# Patient Record
Sex: MALE | Race: WHITE | NOT HISPANIC OR LATINO | Employment: OTHER | URBAN - METROPOLITAN AREA
[De-identification: names, ages, dates, MRNs, and addresses within clinical notes are randomized per-mention and may not be internally consistent; named-entity substitution may affect disease eponyms.]

---

## 2023-03-13 PROBLEM — M48.061 LUMBAR SPINAL STENOSIS: Status: ACTIVE | Noted: 2023-03-13

## 2023-03-13 PROBLEM — J30.9 ALLERGIC RHINITIS: Status: ACTIVE | Noted: 2023-03-13

## 2023-03-13 PROBLEM — E66.811 CLASS 1 OBESITY WITH BODY MASS INDEX (BMI) OF 30.0 TO 30.9 IN ADULT: Status: ACTIVE | Noted: 2023-03-13

## 2023-03-13 PROBLEM — I63.9 CEREBROVASCULAR ACCIDENT (CVA) (MULTI): Status: ACTIVE | Noted: 2023-03-13

## 2023-03-13 PROBLEM — F41.8 SITUATIONAL ANXIETY: Status: ACTIVE | Noted: 2023-03-13

## 2023-03-13 PROBLEM — I71.40 ABDOMINAL AORTIC ANEURYSM WITHOUT RUPTURE (CMS-HCC): Status: ACTIVE | Noted: 2023-03-13

## 2023-03-13 PROBLEM — E66.9 CLASS 1 OBESITY WITH BODY MASS INDEX (BMI) OF 30.0 TO 30.9 IN ADULT: Status: ACTIVE | Noted: 2023-03-13

## 2023-03-13 PROBLEM — I50.20 HFREF (HEART FAILURE WITH REDUCED EJECTION FRACTION) (MULTI): Status: ACTIVE | Noted: 2023-03-13

## 2023-03-13 PROBLEM — H26.9 CATARACT: Status: ACTIVE | Noted: 2023-03-13

## 2023-03-13 PROBLEM — M54.50 LOW BACK PAIN: Status: ACTIVE | Noted: 2023-03-13

## 2023-03-13 PROBLEM — R05.9 COUGH: Status: ACTIVE | Noted: 2023-03-13

## 2023-03-13 PROBLEM — M19.012 ARTHRITIS OF LEFT GLENOHUMERAL JOINT: Status: ACTIVE | Noted: 2023-03-13

## 2023-03-13 PROBLEM — N52.9 ED (ERECTILE DYSFUNCTION): Status: ACTIVE | Noted: 2023-03-13

## 2023-03-13 PROBLEM — R30.0 BURNING WITH URINATION: Status: ACTIVE | Noted: 2023-03-13

## 2023-03-13 PROBLEM — R26.2 DIFFICULTY WALKING: Status: ACTIVE | Noted: 2023-03-13

## 2023-03-13 PROBLEM — R60.0 EDEMA, LOWER EXTREMITY: Status: ACTIVE | Noted: 2023-03-13

## 2023-03-13 PROBLEM — K58.0 IRRITABLE BOWEL SYNDROME WITH DIARRHEA: Status: ACTIVE | Noted: 2023-03-13

## 2023-03-13 PROBLEM — R35.0 URINARY FREQUENCY: Status: ACTIVE | Noted: 2023-03-13

## 2023-03-13 PROBLEM — R55 NEAR SYNCOPE: Status: ACTIVE | Noted: 2023-03-13

## 2023-03-13 PROBLEM — M25.512 PAIN IN JOINT OF LEFT SHOULDER: Status: ACTIVE | Noted: 2023-03-13

## 2023-03-13 PROBLEM — R30.0 DYSURIA: Status: ACTIVE | Noted: 2023-03-13

## 2023-03-13 PROBLEM — I10 HYPERTENSION: Status: ACTIVE | Noted: 2023-03-13

## 2023-03-13 PROBLEM — E78.00 HYPERCHOLESTEREMIA: Status: ACTIVE | Noted: 2023-03-13

## 2023-03-13 PROBLEM — R14.0 ABDOMINAL BLOATING: Status: ACTIVE | Noted: 2023-03-13

## 2023-03-13 PROBLEM — I25.10 CORONARY ARTERY DISEASE: Status: ACTIVE | Noted: 2023-03-13

## 2023-03-13 PROBLEM — G25.0 ESSENTIAL TREMOR: Status: ACTIVE | Noted: 2023-03-13

## 2023-03-13 RX ORDER — ARGININE 500 MG
CAPSULE ORAL SEE ADMIN INSTRUCTIONS
COMMUNITY

## 2023-03-13 RX ORDER — LISINOPRIL 5 MG/1
1 TABLET ORAL DAILY
COMMUNITY
Start: 2018-01-25 | End: 2024-02-12 | Stop reason: SDUPTHER

## 2023-03-13 RX ORDER — VIT C/E/ZN/COPPR/LUTEIN/ZEAXAN 250MG-90MG
1 CAPSULE ORAL DAILY
COMMUNITY

## 2023-03-13 RX ORDER — BACLOFEN 20 MG
1 TABLET ORAL DAILY
COMMUNITY

## 2023-03-13 RX ORDER — TAMSULOSIN HYDROCHLORIDE 0.4 MG/1
1 CAPSULE ORAL NIGHTLY
COMMUNITY
Start: 2022-11-18 | End: 2023-03-14 | Stop reason: ALTCHOICE

## 2023-03-13 RX ORDER — MULTIVITAMIN
TABLET ORAL SEE ADMIN INSTRUCTIONS
COMMUNITY

## 2023-03-13 RX ORDER — ASCORBIC ACID 125 MG
1 TABLET,CHEWABLE ORAL DAILY
COMMUNITY

## 2023-03-13 RX ORDER — ASPIRIN 81 MG/1
1 TABLET ORAL DAILY
COMMUNITY
Start: 2017-10-25

## 2023-03-13 RX ORDER — HYDROCHLOROTHIAZIDE 12.5 MG/1
1 CAPSULE ORAL EVERY OTHER DAY
COMMUNITY
Start: 2019-12-30

## 2023-03-13 RX ORDER — ACETAMINOPHEN 500 MG/1
1 CAPSULE, LIQUID FILLED ORAL DAILY
COMMUNITY

## 2023-03-13 RX ORDER — NITROGLYCERIN 0.4 MG/1
TABLET SUBLINGUAL SEE ADMIN INSTRUCTIONS
COMMUNITY
Start: 2017-09-23

## 2023-03-13 RX ORDER — FAMOTIDINE 20 MG/1
1 TABLET, FILM COATED ORAL DAILY
COMMUNITY
Start: 2017-10-25

## 2023-03-13 RX ORDER — ROSUVASTATIN CALCIUM 40 MG/1
1 TABLET, COATED ORAL DAILY
COMMUNITY
Start: 2017-09-23

## 2023-03-13 RX ORDER — POTASSIUM CHLORIDE 750 MG/1
1 TABLET, FILM COATED, EXTENDED RELEASE ORAL EVERY OTHER DAY
COMMUNITY
Start: 2019-12-30

## 2023-03-14 ENCOUNTER — OFFICE VISIT (OUTPATIENT)
Dept: PRIMARY CARE | Facility: CLINIC | Age: 78
End: 2023-03-14
Payer: MEDICARE

## 2023-03-14 VITALS
HEART RATE: 88 BPM | WEIGHT: 215.6 LBS | SYSTOLIC BLOOD PRESSURE: 122 MMHG | DIASTOLIC BLOOD PRESSURE: 70 MMHG | OXYGEN SATURATION: 98 % | BODY MASS INDEX: 30.07 KG/M2

## 2023-03-14 DIAGNOSIS — H61.23 BILATERAL IMPACTED CERUMEN: ICD-10-CM

## 2023-03-14 DIAGNOSIS — N39.0 URINARY TRACT INFECTION WITHOUT HEMATURIA, SITE UNSPECIFIED: Primary | ICD-10-CM

## 2023-03-14 PROBLEM — R79.89 LOW TESTOSTERONE IN MALE: Status: ACTIVE | Noted: 2023-03-14

## 2023-03-14 LAB
APPEARANCE UR: CLEAR
BILIRUB UR QL STRIP: NEGATIVE
COLOR UR: YELLOW
GLUCOSE UR STRIP-MCNC: NEGATIVE MG/DL
HGB UR QL STRIP: NEGATIVE
KETONES UR STRIP-MCNC: NEGATIVE MG/DL
LEUKOCYTE ESTERASE UR QL STRIP: NEGATIVE
NITRITE UR QL STRIP: NEGATIVE
PH UR STRIP: 7 [PH]
PROT UR STRIP-MCNC: ABNORMAL MG/DL
SP GR UR STRIP.AUTO: 1.02
UROBILINOGEN UR STRIP-ACNC: 0.2 E.U./DL

## 2023-03-14 PROCEDURE — 3074F SYST BP LT 130 MM HG: CPT | Performed by: STUDENT IN AN ORGANIZED HEALTH CARE EDUCATION/TRAINING PROGRAM

## 2023-03-14 PROCEDURE — 1159F MED LIST DOCD IN RCRD: CPT | Performed by: STUDENT IN AN ORGANIZED HEALTH CARE EDUCATION/TRAINING PROGRAM

## 2023-03-14 PROCEDURE — 69209 REMOVE IMPACTED EAR WAX UNI: CPT | Performed by: STUDENT IN AN ORGANIZED HEALTH CARE EDUCATION/TRAINING PROGRAM

## 2023-03-14 PROCEDURE — 99214 OFFICE O/P EST MOD 30 MIN: CPT | Performed by: STUDENT IN AN ORGANIZED HEALTH CARE EDUCATION/TRAINING PROGRAM

## 2023-03-14 PROCEDURE — 87086 URINE CULTURE/COLONY COUNT: CPT

## 2023-03-14 PROCEDURE — 81003 URINALYSIS AUTO W/O SCOPE: CPT | Performed by: STUDENT IN AN ORGANIZED HEALTH CARE EDUCATION/TRAINING PROGRAM

## 2023-03-14 PROCEDURE — 3078F DIAST BP <80 MM HG: CPT | Performed by: STUDENT IN AN ORGANIZED HEALTH CARE EDUCATION/TRAINING PROGRAM

## 2023-03-14 NOTE — PROGRESS NOTES
Subjective   Patient ID: Raghu Williamson is a 77 y.o. male who presents for ear blockage and Cystitis.    HPI   Comes in with bilateral ear clogging desires cerumen removal.  Also he does have a history of recurrent UTIs.  Some mild dysuria today.    Review of Systems  Constitutional: NO F, chills, or sweats  Eyes: no blurred vision or visual disturbance  ENT: Bilateral hearing loss  Cardiovascular: no chest pain, no edema, no palps and no syncope.   Respiratory: no cough,no s.o.b. and no wheezing  Gastrointestinal: no abdominal pain, No C/D no N/V, no blood in stools  Genitourinary: Some dysuria and increased urinary frequency  Objective   /70 (BP Location: Left arm, Patient Position: Sitting, BP Cuff Size: Adult)   Pulse 88   Wt 97.8 kg (215 lb 9.6 oz)   SpO2 98%   BMI 30.07 kg/m²     Physical Exam  gen- a & o x 3, nad, pleasant  heent- eomi, perrla, significant bilateral cerumen impaction, once removed no concerns for infection  Neck- supple, nontender, no palpable or enlarged nodes, no thyromegaly  heart- rrr, no murmurs  lungs- cta b/l , no w/r/r      Assessment/Plan     #1.  Cerumen impaction.  Irrigation today in clinic bilaterally.    2.  Increased urinary frequency dysuria.  Urinalysis and urine culture ordered today.    Will send urine for culture and sensitivity.  Instructed the patient to drink plenty of fluids.  May try cranberry capsules over the counter for prevention.  Discussed ways of reducing chance of recurrence including voiding when sense the urge, urinating after sex and baths/swimming/hot tubs, and wearing cotton underwear.   Take Tylenol or Motrin/Aleve as needed for pain.   Complete all antibiotics until completes.   If you were prescribed Pyridium (phenazopyridine) for urinary burning, only take up to 3 days.

## 2023-03-14 NOTE — PATIENT INSTRUCTIONS
#1.  Cerumen impaction.  Irrigation today in clinic bilaterally.    2.  Increased urinary frequency dysuria.  Urinalysis and urine culture ordered today.    Will send urine for culture and sensitivity.  Instructed the patient to drink plenty of fluids.  May try cranberry capsules over the counter for prevention.  Discussed ways of reducing chance of recurrence including voiding when sense the urge, urinating after sex and baths/swimming/hot tubs, and wearing cotton underwear.   Take Tylenol or Motrin/Aleve as needed for pain.   Complete all antibiotics until completes.   If you were prescribed Pyridium (phenazopyridine) for urinary burning, only take up to 3 days.

## 2023-03-16 LAB — URINE CULTURE: NORMAL

## 2023-08-02 LAB
ALANINE AMINOTRANSFERASE (SGPT) (U/L) IN SER/PLAS: 17 U/L (ref 10–52)
ALBUMIN (G/DL) IN SER/PLAS: 4.2 G/DL (ref 3.4–5)
ALKALINE PHOSPHATASE (U/L) IN SER/PLAS: 55 U/L (ref 33–136)
ANION GAP IN SER/PLAS: 14 MMOL/L (ref 10–20)
ASPARTATE AMINOTRANSFERASE (SGOT) (U/L) IN SER/PLAS: 18 U/L (ref 9–39)
BILIRUBIN TOTAL (MG/DL) IN SER/PLAS: 0.8 MG/DL (ref 0–1.2)
CALCIUM (MG/DL) IN SER/PLAS: 9.7 MG/DL (ref 8.6–10.6)
CARBON DIOXIDE, TOTAL (MMOL/L) IN SER/PLAS: 30 MMOL/L (ref 21–32)
CHLORIDE (MMOL/L) IN SER/PLAS: 101 MMOL/L (ref 98–107)
CHOLESTEROL (MG/DL) IN SER/PLAS: 91 MG/DL (ref 0–199)
CHOLESTEROL IN HDL (MG/DL) IN SER/PLAS: 38.3 MG/DL
CHOLESTEROL/HDL RATIO: 2.4
CREATININE (MG/DL) IN SER/PLAS: 0.86 MG/DL (ref 0.5–1.3)
GFR MALE: 87 ML/MIN/1.73M2
GLUCOSE (MG/DL) IN SER/PLAS: 117 MG/DL (ref 74–99)
LDL: 32 MG/DL (ref 0–99)
POTASSIUM (MMOL/L) IN SER/PLAS: 4.1 MMOL/L (ref 3.5–5.3)
PROTEIN TOTAL: 6.6 G/DL (ref 6.4–8.2)
SODIUM (MMOL/L) IN SER/PLAS: 141 MMOL/L (ref 136–145)
TRIGLYCERIDE (MG/DL) IN SER/PLAS: 102 MG/DL (ref 0–149)
UREA NITROGEN (MG/DL) IN SER/PLAS: 21 MG/DL (ref 6–23)
VLDL: 20 MG/DL (ref 0–40)

## 2023-10-04 ENCOUNTER — OFFICE VISIT (OUTPATIENT)
Dept: ORTHOPEDIC SURGERY | Facility: CLINIC | Age: 78
End: 2023-10-04
Payer: MEDICARE

## 2023-10-04 DIAGNOSIS — M25.512 ARTHRALGIA OF SHOULDER REGION, LEFT: Primary | ICD-10-CM

## 2023-10-04 DIAGNOSIS — M19.012 ARTHRITIS OF LEFT SHOULDER REGION: ICD-10-CM

## 2023-10-04 PROCEDURE — 20610 DRAIN/INJ JOINT/BURSA W/O US: CPT | Performed by: ORTHOPAEDIC SURGERY

## 2023-10-04 PROCEDURE — 1160F RVW MEDS BY RX/DR IN RCRD: CPT | Performed by: ORTHOPAEDIC SURGERY

## 2023-10-04 PROCEDURE — 1036F TOBACCO NON-USER: CPT | Performed by: ORTHOPAEDIC SURGERY

## 2023-10-04 PROCEDURE — 99213 OFFICE O/P EST LOW 20 MIN: CPT | Performed by: ORTHOPAEDIC SURGERY

## 2023-10-04 PROCEDURE — 1159F MED LIST DOCD IN RCRD: CPT | Performed by: ORTHOPAEDIC SURGERY

## 2023-10-04 RX ORDER — LIDOCAINE HYDROCHLORIDE 20 MG/ML
2 INJECTION, SOLUTION INFILTRATION; PERINEURAL
Status: COMPLETED | OUTPATIENT
Start: 2023-10-04 | End: 2023-10-04

## 2023-10-04 RX ORDER — TRIAMCINOLONE ACETONIDE 40 MG/ML
80 INJECTION, SUSPENSION INTRA-ARTICULAR; INTRAMUSCULAR
Status: COMPLETED | OUTPATIENT
Start: 2023-10-04 | End: 2023-10-04

## 2023-10-04 RX ADMIN — LIDOCAINE HYDROCHLORIDE 2 ML: 20 INJECTION, SOLUTION INFILTRATION; PERINEURAL at 15:02

## 2023-10-04 RX ADMIN — TRIAMCINOLONE ACETONIDE 80 MG: 40 INJECTION, SUSPENSION INTRA-ARTICULAR; INTRAMUSCULAR at 15:02

## 2023-10-04 NOTE — PROGRESS NOTES
Subjective    Patient ID: Raghu Williamson is a 78 y.o. male.    Chief Complaint: Left shoulder pain  Last Surgery: No surgery found  Last Surgery Date: No surgery found    HPI  Patient is an 78-year-old male who comes in for follow-up of his left shoulder arthritis.  He has been treating this conservatively.  His last Kenalog injection was approximately 8 months ago.  He denies any new trauma.  He denies numbness and paresthesias in his left upper extremity.    Objective   Ortho Exam  Patient is in no acute distress.  Exam of his left shoulder reveals active forward elevation is about 145 degrees.  There is mild ratcheting crepitus with rotation.  He is neurovascular intact distally to his median, radial ulnar nerves.  There is no warmth erythema in his left shoulder.  He has 4 out of 5 strength with resisted forward elevation as well as with resisted internal and external rotation.    Assessment/Plan   Encounter Diagnoses:  Arthralgia of shoulder region, left    Arthritis of left shoulder region    Patient has left shoulder pain secondary to his known left shoulder arthritis.  He elected undergo another Kenalog injection today in the office.    L Inj/Asp: L glenohumeral on 10/4/2023 3:02 PM  Indications: pain  Details: 22 G needle, posterior approach  Medications: 80 mg triamcinolone acetonide 40 mg/mL; 2 mL lidocaine 20 mg/mL (2 %)  Procedure, treatment alternatives, risks and benefits explained, specific risks discussed. Consent was given by the patient. Immediately prior to procedure a time out was called to verify the correct patient, procedure, equipment, support staff and site/side marked as required. Patient was prepped and draped in the usual sterile fashion.       Patient tolerated the procedure without any complications.  He will follow-up as his symptoms dictate.

## 2023-10-04 NOTE — LETTER
October 4, 2023     Carlos Manuel Vicente DO  [address]  [address]  [facility] [address]    Patient: Raghu Williamson   YOB: 1945   Date of Visit: 10/4/2023       Dear Dr. Carlos Manuel Vicente DO:    Thank you for referring Raghu Williamson to me for evaluation. Below are my notes for this consultation.  If you have questions, please do not hesitate to call me. I look forward to following your patient along with you.       Sincerely,     Valerio Cortez MD      CC: No Recipients  ______________________________________________________________________________________    Subjective   Patient ID: Raghu Williamson is a 78 y.o. male.    Chief Complaint: Left shoulder pain  Last Surgery: No surgery found  Last Surgery Date: No surgery found    HPI  Patient is an 78-year-old male who comes in for follow-up of his left shoulder arthritis.  He has been treating this conservatively.  His last Kenalog injection was approximately 8 months ago.  He denies any new trauma.  He denies numbness and paresthesias in his left upper extremity.    Objective  Ortho Exam  Patient is in no acute distress.  Exam of his left shoulder reveals active forward elevation is about 145 degrees.  There is mild ratcheting crepitus with rotation.  He is neurovascular intact distally to his median, radial ulnar nerves.  There is no warmth erythema in his left shoulder.  He has 4 out of 5 strength with resisted forward elevation as well as with resisted internal and external rotation.    Assessment/Plan  Encounter Diagnoses:  Arthralgia of shoulder region, left    Arthritis of left shoulder region    Patient has left shoulder pain secondary to his known left shoulder arthritis.  He elected undergo another Kenalog injection today in the office.    L Inj/Asp: L glenohumeral on 10/4/2023 3:02 PM  Indications: pain  Details: 22 G needle, posterior approach  Medications: 80 mg triamcinolone acetonide 40 mg/mL; 2 mL lidocaine 20 mg/mL (2  %)  Procedure, treatment alternatives, risks and benefits explained, specific risks discussed. Consent was given by the patient. Immediately prior to procedure a time out was called to verify the correct patient, procedure, equipment, support staff and site/side marked as required. Patient was prepped and draped in the usual sterile fashion.       Patient tolerated the procedure without any complications.  He will follow-up as his symptoms dictate.

## 2023-10-05 ENCOUNTER — LAB (OUTPATIENT)
Dept: LAB | Facility: LAB | Age: 78
End: 2023-10-05
Payer: MEDICARE

## 2023-10-05 DIAGNOSIS — Z12.5 ENCOUNTER FOR SCREENING FOR MALIGNANT NEOPLASM OF PROSTATE: ICD-10-CM

## 2023-10-05 DIAGNOSIS — Z12.5 ENCOUNTER FOR SCREENING FOR MALIGNANT NEOPLASM OF PROSTATE: Primary | ICD-10-CM

## 2023-10-05 PROCEDURE — 36415 COLL VENOUS BLD VENIPUNCTURE: CPT

## 2023-10-05 PROCEDURE — G0103 PSA SCREENING: HCPCS

## 2023-10-06 LAB — PSA SERPL-MCNC: 2.34 NG/ML

## 2023-10-09 ENCOUNTER — TELEPHONE (OUTPATIENT)
Dept: PRIMARY CARE | Facility: CLINIC | Age: 78
End: 2023-10-09

## 2023-10-09 ENCOUNTER — CLINICAL SUPPORT (OUTPATIENT)
Dept: PRIMARY CARE | Facility: CLINIC | Age: 78
End: 2023-10-09
Payer: MEDICARE

## 2023-10-09 PROCEDURE — G0008 ADMIN INFLUENZA VIRUS VAC: HCPCS | Performed by: FAMILY MEDICINE

## 2023-10-09 PROCEDURE — 90662 IIV NO PRSV INCREASED AG IM: CPT | Performed by: FAMILY MEDICINE

## 2023-10-12 ENCOUNTER — APPOINTMENT (OUTPATIENT)
Dept: UROLOGY | Facility: HOSPITAL | Age: 78
End: 2023-10-12
Payer: MEDICARE

## 2023-11-21 ENCOUNTER — OFFICE VISIT (OUTPATIENT)
Dept: UROLOGY | Facility: CLINIC | Age: 78
End: 2023-11-21
Payer: MEDICARE

## 2023-11-21 VITALS
TEMPERATURE: 97.5 F | SYSTOLIC BLOOD PRESSURE: 151 MMHG | HEART RATE: 80 BPM | DIASTOLIC BLOOD PRESSURE: 85 MMHG | BODY MASS INDEX: 30.13 KG/M2 | WEIGHT: 216 LBS

## 2023-11-21 DIAGNOSIS — R30.0 DYSURIA: ICD-10-CM

## 2023-11-21 DIAGNOSIS — N52.9 ERECTILE DYSFUNCTION, UNSPECIFIED ERECTILE DYSFUNCTION TYPE: ICD-10-CM

## 2023-11-21 DIAGNOSIS — R35.0 URINARY FREQUENCY: ICD-10-CM

## 2023-11-21 DIAGNOSIS — N32.81 OAB (OVERACTIVE BLADDER): Primary | ICD-10-CM

## 2023-11-21 LAB
POC APPEARANCE, URINE: CLEAR
POC BILIRUBIN, URINE: NEGATIVE
POC BLOOD, URINE: NEGATIVE
POC COLOR, URINE: YELLOW
POC GLUCOSE, URINE: NEGATIVE MG/DL
POC KETONES, URINE: NEGATIVE MG/DL
POC LEUKOCYTES, URINE: NEGATIVE
POC NITRITE,URINE: NEGATIVE
POC PH, URINE: 7 PH
POC PROTEIN, URINE: NEGATIVE MG/DL
POC SPECIFIC GRAVITY, URINE: 1.02
POC UROBILINOGEN, URINE: 0.2 EU/DL

## 2023-11-21 PROCEDURE — 1036F TOBACCO NON-USER: CPT | Performed by: STUDENT IN AN ORGANIZED HEALTH CARE EDUCATION/TRAINING PROGRAM

## 2023-11-21 PROCEDURE — 99215 OFFICE O/P EST HI 40 MIN: CPT | Performed by: STUDENT IN AN ORGANIZED HEALTH CARE EDUCATION/TRAINING PROGRAM

## 2023-11-21 PROCEDURE — 1159F MED LIST DOCD IN RCRD: CPT | Performed by: STUDENT IN AN ORGANIZED HEALTH CARE EDUCATION/TRAINING PROGRAM

## 2023-11-21 PROCEDURE — 81003 URINALYSIS AUTO W/O SCOPE: CPT | Performed by: STUDENT IN AN ORGANIZED HEALTH CARE EDUCATION/TRAINING PROGRAM

## 2023-11-21 PROCEDURE — 3079F DIAST BP 80-89 MM HG: CPT | Performed by: STUDENT IN AN ORGANIZED HEALTH CARE EDUCATION/TRAINING PROGRAM

## 2023-11-21 PROCEDURE — 3077F SYST BP >= 140 MM HG: CPT | Performed by: STUDENT IN AN ORGANIZED HEALTH CARE EDUCATION/TRAINING PROGRAM

## 2023-11-21 PROCEDURE — 1160F RVW MEDS BY RX/DR IN RCRD: CPT | Performed by: STUDENT IN AN ORGANIZED HEALTH CARE EDUCATION/TRAINING PROGRAM

## 2023-11-22 ENCOUNTER — TELEPHONE (OUTPATIENT)
Dept: UROLOGY | Facility: CLINIC | Age: 78
End: 2023-11-22
Payer: MEDICARE

## 2023-11-22 NOTE — TELEPHONE ENCOUNTER
Pt called in and said flomax was never sent in yesterday after appointment. Would like it sent to [facility] in [place]. Thanks!

## 2023-11-23 RX ORDER — TAMSULOSIN HYDROCHLORIDE 0.4 MG/1
0.4 CAPSULE ORAL DAILY
Qty: 30 CAPSULE | Refills: 2 | Status: SHIPPED | OUTPATIENT
Start: 2023-11-23 | End: 2024-01-16 | Stop reason: SDUPTHER

## 2023-11-23 NOTE — PROGRESS NOTES
Raghu Williamson 1945 is a AGE@ male seen today for follow-up.    Referred by: No referring provider defined for this encounter.    CC: Urinary frequency     HPI:    Patient is an 78-year-old male with a past medical history significant for lower urinary tract symptoms, bladder stone, kidney stone, urinary urgency, erectile dysfunction who presents for his routine follow-up  Patient was last seen by Dr. Costello in August 2023 at which point he had significant complaints of urinary urgency  There was concern that he may have a active bladder stone versus kidney stone  Plan was for a CT scan which was personally reviewed by me today  On encounter today patient states that he has persistent urinary urgency  He tried Flomax for few days but then stopped because he felt like it was not benefiting him    He is also interested in potential interventions for erectile dysfunction  He has tried oral medications with no benefit  I had a discussion with him about ICI today and he would like to think about this    Prostate Specific AG   Date Value Ref Range Status   10/05/2023 2.34 <=4.00 ng/mL Final     Comment:     result     PSA   Date Value Ref Range Status   04/26/2022 2.87 0.00 - 4.00 ng/mL Final     Comment:     The FDA requires that the method used for PSA assay be   reported to the physician. Values obtained with different   assay methods must not be used interchangeably. This test   was performed at [facility] using the Siemens  Twyxt PSA method, which is a sandwich immunoassay using   chemiluminescence for quantitation. The assay is approved  for measurement of prostate-specific antigen (PSA) in   serum and may be used in conjunction with a digital rectal  examination in men 50 years and older as an aid in   detection of prostate cancer.   5-Alpha-reductase inhibitors (e.g. Proscar, Finasteride,   Avodart, Dutasteride and Flori) for the treatment of BPH   have been shown to lower PSA levels by an  average of 50%   after 6 months of treatment.     04/27/2021 1.77 0.00 - 4.00 ng/mL Final     Comment:     The FDA requires that the method used for PSA assay be   reported to the physician. Values obtained with different   assay methods must not be used interchangeably. This test   was performed at [facility] using the Siemens  Atellica PSA method, which is a sandwich immunoassay using   chemiluminescence for quantitation. The assay is approved  for measurement of prostate-specific antigen (PSA) in   serum and may be used in conjunction with a digital rectal  examination in men 50 years and older as an aid in   detection of prostate cancer.   5-Alpha-reductase inhibitors (e.g. Proscar, Finasteride,   Avodart, Dutasteride and Flori) for the treatment of BPH   have been shown to lower PSA levels by an average of 50%   after 6 months of treatment.     03/19/2019 1.40 0.00 - 4.00 ng/mL Final     Comment:     The FDA requires that the method used for PSA  assay be reported to the physician. Values  obtained with different assay methods must not  be used interchangeably.  uses the ADVIA  Centaur PSA method, which is a sandwich  immunoassay using chemiluminescence for  quantitation. The assay is approved for  measurement of prostate-specific antigen (PSA)  in serum and may be used in conjunction with  a digital rectal examination in men 50 years  and older as an aid in detection of prostate  cancer.      03/14/2018 1.25 0.00 - 4.00 ng/mL Final     Comment:     The FDA requires that the method used for PSA  assay be reported to the physician. Values  obtained with different assay methods must not  be used interchangeably.  uses the ADVIA  Centaur PSA method, which is a sandwich  immunoassay using chemiluminescence for  quantitation. The assay is approved for  measurement of prostate-specific antigen (PSA)  in serum and may be used in conjunction with  a digital rectal examination in men 50 years  and  older as an aid in detection of prostate  cancer.           reports that he has never smoked. He has never used smokeless tobacco. He reports that he does not drink alcohol.    Current Outpatient Medications   Medication Sig Dispense Refill    acetaminophen 500 mg capsule Take 1 capsule (500 mg) by mouth once daily.      cholecalciferol (Vitamin D-3) 25 MCG (1000 UT) capsule Take 1 capsule (25 mcg) by mouth once daily.      cyanocobalamin, vitamin B-12, 5,000 mcg capsule Take 1 tablet by mouth once daily.      famotidine (Pepcid) 20 mg tablet Take 1 tablet (20 mg) by mouth once daily.      fish oil concentrate (Omega-3) 120-180 mg capsule Take 1 capsule (1 g) by mouth once daily.      hydroCHLOROthiazide (Microzide) 12.5 mg capsule Take 1 capsule (12.5 mg) by mouth every other day.      lisinopril 5 mg tablet Take 1 tablet (5 mg) by mouth once daily.      magnesium oxide 500 mg tablet Take 1 tablet (500 mg) by mouth once daily.      multivitamin tablet Take by mouth see administration instructions.      nitroglycerin (Nitrostat) 0.4 mg SL tablet Place under the tongue see administration instructions. Take 1 tablet under the tongue every 5 minutes as needed for chest pain      potassium chloride CR (Klor-Con) 10 mEq ER tablet Take 1 tablet (10 mEq) by mouth every other day.      rosuvastatin (Crestor) 40 mg tablet Take 1 tablet (40 mg) by mouth once daily.      arginine, L-arginine, 500 mg capsule Take by mouth see administration instructions.      aspirin 81 mg EC tablet Take 1 tablet (81 mg) by mouth once daily.      NON FORMULARY CBD oil BID      tamsulosin (Flomax) 0.4 mg 24 hr capsule Take 1 capsule (0.4 mg) by mouth once daily. 30 capsule 2     No current facility-administered medications for this visit.       Past Surgical History:   Procedure Laterality Date    CARDIAC CATHETERIZATION  07/26/2018    Cardiac Cath Procedure Summary    HAND SURGERY  11/17/2015    Hand Surgery                                       "                                                                                                                       Family History   Problem Relation Name Age of Onset    Cancer Mother          malignant neoplasm        Allergies   Allergen Reactions    Penicillins Unknown        Past Medical History:   Diagnosis Date    Abnormal findings on diagnostic imaging of heart and coronary circulation     Abnormal echocardiogram    Atherosclerotic heart disease of native coronary artery without angina pectoris 11/18/2022    Coronary artery disease    Body mass index (BMI) 29.0-29.9, adult 05/21/2021    Body mass index (BMI) of 29.0 to 29.9 in adult    Essential (primary) hypertension 07/20/2022    Hypertension, unspecified type    Gout, unspecified     Gout, arthritis    Personal history of nicotine dependence     History of tobacco abuse    Personal history of other diseases of the circulatory system     History of cardiac disorder    Personal history of other specified conditions     History of chest pain    Personal history of transient ischemic attack (TIA), and cerebral infarction without residual deficits 07/30/2019    History of cerebrovascular accident        Review of Systems:   No fevers, chills, chest pain, or shortness of breath.     Physical Exam:  General: no Acute distress, appears well   Psych: Alert and oriented X 3  : Patent meatus with bilaterally palpable testes    Lab Results   Component Value Date    WBC 9.5 05/21/2021    HGB 15.6 05/21/2021    HCT 48.1 05/21/2021     05/21/2021     05/21/2021     Lab Results   Component Value Date    GLUCOSE 117 (H) 08/02/2023    CALCIUM 9.7 08/02/2023     08/02/2023    K 4.1 08/02/2023    CO2 30 08/02/2023     08/02/2023    BUN 21 08/02/2023    CREATININE 0.86 08/02/2023     Lab Results   Component Value Date    PSA 2.34 10/05/2023    PSA 2.87 04/26/2022    PSA 1.77 04/27/2021     No results found for: \"HGBA1C\"  No results found.    PVR "       Raghu was seen today for oab.  Diagnoses and all orders for this visit:  OAB (overactive bladder) (Primary)  -     Cancel: POCT UA Automated manually resulted  -     Measure post void residual  -     POCT UA Automated manually resulted  Urinary frequency  -     tamsulosin (Flomax) 0.4 mg 24 hr capsule; Take 1 capsule (0.4 mg) by mouth once daily.  Erectile dysfunction, unspecified erectile dysfunction type  Dysuria    Is an 78-year-old male with multiple urologic complaints.  Urinary frequency is his primary complaint.  He has a history of bladder stones and kidney stones in the past.  He most recently underwent a CT scan which does not demonstrate any stones in his bladder and there is a small 3 mm stone in his kidney.  He has been prescribed Flomax but has not really taken it.    1.  Lower urinary tract symptoms  I encouraged the patient to make a concerted effort with the Flomax as this may improve his voiding parameters.  Once we have trialed the Flomax we may consider adding trospium.  In addition we will plan to perform cystoscopy if warranted to rule out any anatomic abnormalities.    2.  Erectile dysfunction  I had a extensive discussion with the patient about management of his erectile dysfunction.  His wife is present with him and urges that no aggressive interventions be taken as it is not a high priority for them.  We will follow-up on this.    The dictation was performed using Dragon software. Please excuse any errors. Please contact our office with any inquiries or clarifications.    [unfilled]    Isabel Cardona MD

## 2023-11-28 ENCOUNTER — APPOINTMENT (OUTPATIENT)
Dept: PRIMARY CARE | Facility: CLINIC | Age: 78
End: 2023-11-28
Payer: MEDICARE

## 2023-12-11 ENCOUNTER — OFFICE VISIT (OUTPATIENT)
Dept: PRIMARY CARE | Facility: CLINIC | Age: 78
End: 2023-12-11
Payer: MEDICARE

## 2023-12-11 VITALS
OXYGEN SATURATION: 96 % | WEIGHT: 219.2 LBS | DIASTOLIC BLOOD PRESSURE: 82 MMHG | HEIGHT: 72 IN | TEMPERATURE: 97.7 F | BODY MASS INDEX: 29.69 KG/M2 | SYSTOLIC BLOOD PRESSURE: 136 MMHG | HEART RATE: 78 BPM

## 2023-12-11 DIAGNOSIS — I50.20 HFREF (HEART FAILURE WITH REDUCED EJECTION FRACTION) (MULTI): ICD-10-CM

## 2023-12-11 DIAGNOSIS — Z00.00 MEDICARE ANNUAL WELLNESS VISIT, SUBSEQUENT: Primary | ICD-10-CM

## 2023-12-11 DIAGNOSIS — M48.061 SPINAL STENOSIS OF LUMBAR REGION, UNSPECIFIED WHETHER NEUROGENIC CLAUDICATION PRESENT: ICD-10-CM

## 2023-12-11 DIAGNOSIS — I71.40 ABDOMINAL AORTIC ANEURYSM (AAA) WITHOUT RUPTURE, UNSPECIFIED PART (CMS-HCC): ICD-10-CM

## 2023-12-11 DIAGNOSIS — I10 HYPERTENSION, UNSPECIFIED TYPE: ICD-10-CM

## 2023-12-11 DIAGNOSIS — E78.00 HYPERCHOLESTEREMIA: ICD-10-CM

## 2023-12-11 DIAGNOSIS — E66.9 CLASS 1 OBESITY WITHOUT SERIOUS COMORBIDITY WITH BODY MASS INDEX (BMI) OF 30.0 TO 30.9 IN ADULT, UNSPECIFIED OBESITY TYPE: ICD-10-CM

## 2023-12-11 PROCEDURE — 99397 PER PM REEVAL EST PAT 65+ YR: CPT | Performed by: FAMILY MEDICINE

## 2023-12-11 PROCEDURE — G0439 PPPS, SUBSEQ VISIT: HCPCS | Performed by: FAMILY MEDICINE

## 2023-12-11 PROCEDURE — 3079F DIAST BP 80-89 MM HG: CPT | Performed by: FAMILY MEDICINE

## 2023-12-11 PROCEDURE — 99497 ADVNCD CARE PLAN 30 MIN: CPT | Performed by: FAMILY MEDICINE

## 2023-12-11 PROCEDURE — 1159F MED LIST DOCD IN RCRD: CPT | Performed by: FAMILY MEDICINE

## 2023-12-11 PROCEDURE — 1170F FXNL STATUS ASSESSED: CPT | Performed by: FAMILY MEDICINE

## 2023-12-11 PROCEDURE — 1126F AMNT PAIN NOTED NONE PRSNT: CPT | Performed by: FAMILY MEDICINE

## 2023-12-11 PROCEDURE — 1036F TOBACCO NON-USER: CPT | Performed by: FAMILY MEDICINE

## 2023-12-11 PROCEDURE — 1158F ADVNC CARE PLAN TLK DOCD: CPT | Performed by: FAMILY MEDICINE

## 2023-12-11 PROCEDURE — 3075F SYST BP GE 130 - 139MM HG: CPT | Performed by: FAMILY MEDICINE

## 2023-12-11 PROCEDURE — 1160F RVW MEDS BY RX/DR IN RCRD: CPT | Performed by: FAMILY MEDICINE

## 2023-12-11 RX ORDER — POTASSIUM CHLORIDE 750 MG/1
1 TABLET, FILM COATED, EXTENDED RELEASE ORAL DAILY
COMMUNITY
Start: 2019-12-30 | End: 2023-12-11 | Stop reason: WASHOUT

## 2023-12-11 RX ORDER — NAPROXEN 375 MG/1
TABLET ORAL
COMMUNITY

## 2023-12-11 RX ORDER — TAMSULOSIN HYDROCHLORIDE 0.4 MG/1
CAPSULE ORAL
COMMUNITY
End: 2023-12-11 | Stop reason: WASHOUT

## 2023-12-11 RX ORDER — HYDROCHLOROTHIAZIDE 12.5 MG/1
1 CAPSULE ORAL DAILY
COMMUNITY
Start: 2019-12-30 | End: 2023-12-11 | Stop reason: WASHOUT

## 2023-12-11 ASSESSMENT — ACTIVITIES OF DAILY LIVING (ADL)
DRESSING YOURSELF: INDEPENDENT
EATING: INDEPENDENT
FEEDING YOURSELF: INDEPENDENT
BATHING: INDEPENDENT
TAKING MEDICATION: INDEPENDENT
BATHING: INDEPENDENT
MANAGING FINANCES: INDEPENDENT
USING TELEPHONE: INDEPENDENT
GROOMING: INDEPENDENT
WALKS IN HOME: INDEPENDENT
DOING HOUSEWORK: INDEPENDENT
PATIENT'S MEMORY ADEQUATE TO SAFELY COMPLETE DAILY ACTIVITIES?: YES
JUDGMENT_ADEQUATE_SAFELY_COMPLETE_DAILY_ACTIVITIES: YES
TOILETING: INDEPENDENT
TOILETING: INDEPENDENT
ADEQUATE_TO_COMPLETE_ADL: YES
DRESSING: INDEPENDENT
STIL DRIVING: YES
NEEDS ASSISTANCE WITH FOOD: INDEPENDENT
GROCERY SHOPPING: INDEPENDENT
PREPARING MEALS: INDEPENDENT
ADEQUATE_TO_COMPLETE_ADL: YES
JUDGMENT_ADEQUATE_SAFELY_COMPLETE_DAILY_ACTIVITIES: YES
FEEDING: INDEPENDENT

## 2023-12-11 ASSESSMENT — ENCOUNTER SYMPTOMS
CONSTITUTIONAL NEGATIVE: 1
PSYCHIATRIC NEGATIVE: 1
BACK PAIN: 1
OCCASIONAL FEELINGS OF UNSTEADINESS: 0
ARTHRALGIAS: 1
RESPIRATORY NEGATIVE: 1
EYES NEGATIVE: 1
ENDOCRINE NEGATIVE: 1
LOSS OF SENSATION IN FEET: 0
NEUROLOGICAL NEGATIVE: 1
GASTROINTESTINAL NEGATIVE: 1
DEPRESSION: 0

## 2023-12-11 ASSESSMENT — ANXIETY QUESTIONNAIRES
5. BEING SO RESTLESS THAT IT IS HARD TO SIT STILL: NOT AT ALL
1. FEELING NERVOUS, ANXIOUS, OR ON EDGE: NOT AT ALL
2. NOT BEING ABLE TO STOP OR CONTROL WORRYING: NOT AT ALL
3. WORRYING TOO MUCH ABOUT DIFFERENT THINGS: NOT AT ALL
GAD7 TOTAL SCORE: 0
6. BECOMING EASILY ANNOYED OR IRRITABLE: NOT AT ALL
7. FEELING AFRAID AS IF SOMETHING AWFUL MIGHT HAPPEN: NOT AT ALL
4. TROUBLE RELAXING: NOT AT ALL

## 2023-12-11 ASSESSMENT — GERIATRIC MINI NUTRITIONAL ASSESSMENT (MNA)
A HAS FOOD INTAKE DECLINED OVER THE PAST 3 MONTHS DUE TO LOSS OF APPETITE, DIGESTIVE PROBLEMS, CHEWING OR SWALLOWING DIFFICULTIES?: NO DECREASE IN FOOD INTAKE
E NEUROPSYCHOLOGICAL PROBLEMS: NO PSYCHOLOGICAL PROBLEMS
C GENERAL MOBILITY: GOES OUT
B WEIGHT LOSS DURING THE LAST 3 MONTHS: NO WEIGHT LOSS
D HAS SUFFERED PSYCHOLOGICAL STRESS OR ACUTE DISEASE IN THE PAST 3 MONTHS?: NO

## 2023-12-11 ASSESSMENT — PAIN SCALES - GENERAL: PAINLEVEL: 0-NO PAIN

## 2023-12-11 NOTE — ACP (ADVANCE CARE PLANNING)
Confirming Previous Code Status:   Advance Care Planning Note     Discussion Date: 12/11/23   Discussion Participants: patient    The patient wishes to discuss Advance Care Planning today and the following is a brief summary of our discussion.     Patient has capacity to make their own medical decisions: Yes  Health Care Agent/Surrogate Decision Maker documented in chart: Yes    Documents on file and valid:  Advance Directive/Living Will: Yes   Health Care Power of : Yes  Other: none    Communication of Medical Status/Prognosis:   yes     Communication of Treatment Goals/Options:   yes     Treatment Decisions  Goals of Care: survival is paramount regardless of prognosis, treatment outcome, or burden   yes  Follow Up Plan  no  Team Members  myself  Time Statement: Total face to face time spent on advance care planning was 16 minutes with 16 minutes spent in counseling, including the explanation.    Carlos Manuel Vicente,   12/11/2023 1:01 PM

## 2023-12-11 NOTE — PROGRESS NOTES
"Subjective   Patient ID: Raghu Williamson is a 78 y.o. male who presents for Annual Exam (Annual medicare wellness not fasting ).    HPI     Review of Systems   Constitutional: Negative.    HENT: Negative.     Eyes: Negative.    Respiratory: Negative.     Cardiovascular:         Dr Henderson   Gastrointestinal: Negative.    Endocrine: Negative.    Genitourinary: Negative.         Sees urology   Musculoskeletal:  Positive for arthralgias and back pain.   Neurological: Negative.    Psychiatric/Behavioral: Negative.         Objective   /82 (BP Location: Left arm)   Pulse 78   Temp 36.5 °C (97.7 °F) (Temporal)   Ht 1.816 m (5' 11.5\")   Wt 99.4 kg (219 lb 3.2 oz)   SpO2 96%   BMI 30.15 kg/m²     Physical Exam  Vitals and nursing note reviewed.   HENT:      Right Ear: Tympanic membrane normal.      Left Ear: Tympanic membrane normal.      Mouth/Throat:      Pharynx: Oropharynx is clear.   Cardiovascular:      Rate and Rhythm: Normal rate and regular rhythm.      Pulses: Normal pulses.      Heart sounds: Normal heart sounds.   Pulmonary:      Breath sounds: Normal breath sounds.   Abdominal:      Palpations: Abdomen is soft.      Comments: Ventral hernia   Musculoskeletal:      Comments: Lumbar stenosis   Neurological:      General: No focal deficit present.      Mental Status: He is alert and oriented to person, place, and time.   Psychiatric:         Mood and Affect: Mood normal.         Behavior: Behavior normal.         Assessment/Plan patient seen here for an annual Medicare wellness exam.  We reviewed his questionnaire he is agreeable to his responses.  We did discuss advanced directives.  He has no significant difficulty with depression or anxiety.  He had recent lab work done by cardiology.  Will see him back in a year  Problem List Items Addressed This Visit             ICD-10-CM    Abdominal aortic aneurysm without rupture (CMS/[facility]) I71.40    Class 1 obesity with body mass index (BMI) of 30.0 to 30.9 in adult " E66.9, Z68.30    HFrEF (heart failure with reduced ejection fraction) (CMS/[facility]) I50.20    Hypercholesteremia E78.00    Hypertension I10    Lumbar spinal stenosis M48.061     Other Visit Diagnoses         Codes    Medicare annual wellness visit, subsequent    -  Primary Z00.00

## 2023-12-12 ENCOUNTER — TELEPHONE (OUTPATIENT)
Dept: PRIMARY CARE | Facility: CLINIC | Age: 78
End: 2023-12-12
Payer: MEDICARE

## 2023-12-12 NOTE — TELEPHONE ENCOUNTER
Raghu had a wellness visit the other day and on his AVS he noticed it mentioned high cholesterol and Raghu is confused because he does not have high cholesterol? He wants to know if you are able to change that/remove it from his chart

## 2024-01-09 ENCOUNTER — APPOINTMENT (OUTPATIENT)
Dept: UROLOGY | Facility: CLINIC | Age: 79
End: 2024-01-09
Payer: MEDICARE

## 2024-01-16 ENCOUNTER — TELEMEDICINE (OUTPATIENT)
Dept: UROLOGY | Facility: CLINIC | Age: 79
End: 2024-01-16
Payer: MEDICARE

## 2024-01-16 DIAGNOSIS — R35.0 URINARY FREQUENCY: ICD-10-CM

## 2024-01-16 DIAGNOSIS — R30.0 DYSURIA: Primary | ICD-10-CM

## 2024-01-16 PROCEDURE — 99215 OFFICE O/P EST HI 40 MIN: CPT | Performed by: STUDENT IN AN ORGANIZED HEALTH CARE EDUCATION/TRAINING PROGRAM

## 2024-01-16 RX ORDER — TAMSULOSIN HYDROCHLORIDE 0.4 MG/1
0.4 CAPSULE ORAL DAILY
Qty: 90 CAPSULE | Refills: 3 | Status: SHIPPED | OUTPATIENT
Start: 2024-01-16 | End: 2024-04-16 | Stop reason: SDUPTHER

## 2024-01-16 NOTE — PROGRESS NOTES
Raghu Williamson 1945 is a AGE@ male seen today for follow-up.    Referred by: No referring provider defined for this encounter.    CC: Urinary frequency     HPI:    1/16/2024:  I had a telephone visit with patient  Patient states that he has been doing very well on the prescription of Flomax and he is very satisfied with his voiding parameters at this point  He states that he is still getting up approximately 8 times at night he is down to 2-3 times  If his voiding parameters were to remain at this level he would be satisfied  He feels like he is emptying his bladder completely    11/21/2023:  Patient is an 78-year-old male with a past medical history significant for lower urinary tract symptoms, bladder stone, kidney stone, urinary urgency, erectile dysfunction who presents for his routine follow-up  Patient was last seen by Dr. Costello in August 2023 at which point he had significant complaints of urinary urgency  There was concern that he may have a active bladder stone versus kidney stone  Plan was for a CT scan which was personally reviewed by me today  On encounter today patient states that he has persistent urinary urgency  He tried Flomax for few days but then stopped because he felt like it was not benefiting him    He is also interested in potential interventions for erectile dysfunction  He has tried oral medications with no benefit  I had a discussion with him about ICI today and he would like to think about this    Prostate Specific AG   Date Value Ref Range Status   10/05/2023 2.34 <=4.00 ng/mL Final     Comment:     result     PSA   Date Value Ref Range Status   04/26/2022 2.87 0.00 - 4.00 ng/mL Final     Comment:     The FDA requires that the method used for PSA assay be   reported to the physician. Values obtained with different   assay methods must not be used interchangeably. This test   was performed at [facility] using the Siemens  [employer]apipe PSA method, which is a sandwich  immunoassay using   chemiluminescence for quantitation. The assay is approved  for measurement of prostate-specific antigen (PSA) in   serum and may be used in conjunction with a digital rectal  examination in men 50 years and older as an aid in   detection of prostate cancer.   5-Alpha-reductase inhibitors (e.g. Proscar, Finasteride,   Avodart, Dutasteride and Flori) for the treatment of BPH   have been shown to lower PSA levels by an average of 50%   after 6 months of treatment.     04/27/2021 1.77 0.00 - 4.00 ng/mL Final     Comment:     The FDA requires that the method used for PSA assay be   reported to the physician. Values obtained with different   assay methods must not be used interchangeably. This test   was performed at [facility] using the Siemens  [employer]unn[employer] PSA method, which is a sandwich immunoassay using   chemiluminescence for quantitation. The assay is approved  for measurement of prostate-specific antigen (PSA) in   serum and may be used in conjunction with a digital rectal  examination in men 50 years and older as an aid in   detection of prostate cancer.   5-Alpha-reductase inhibitors (e.g. Proscar, Finasteride,   Avodart, Dutasteride and Flori) for the treatment of BPH   have been shown to lower PSA levels by an average of 50%   after 6 months of treatment.     03/19/2019 1.40 0.00 - 4.00 ng/mL Final     Comment:     The FDA requires that the method used for PSA  assay be reported to the physician. Values  obtained with different assay methods must not  be used interchangeably.  uses the ADVIA  Centaur PSA method, which is a sandwich  immunoassay using chemiluminescence for  quantitation. The assay is approved for  measurement of prostate-specific antigen (PSA)  in serum and may be used in conjunction with  a digital rectal examination in men 50 years  and older as an aid in detection of prostate  cancer.      03/14/2018 1.25 0.00 - 4.00 ng/mL Final     Comment:     The FDA  requires that the method used for PSA  assay be reported to the physician. Values  obtained with different assay methods must not  be used interchangeably.  uses the ADVIA  Centaur PSA method, which is a sandwich  immunoassay using chemiluminescence for  quantitation. The assay is approved for  measurement of prostate-specific antigen (PSA)  in serum and may be used in conjunction with  a digital rectal examination in men 50 years  and older as an aid in detection of prostate  cancer.           reports that he has never smoked. He has never used smokeless tobacco. He reports that he does not drink alcohol.    Current Outpatient Medications   Medication Sig Dispense Refill    acetaminophen 500 mg capsule Take 1 capsule (500 mg) by mouth once daily.      arginine, L-arginine, 500 mg capsule Take by mouth see administration instructions.      aspirin 81 mg EC tablet Take 1 tablet (81 mg) by mouth once daily.      cholecalciferol (Vitamin D-3) 25 MCG (1000 UT) capsule Take 1 capsule (25 mcg) by mouth once daily.      cyanocobalamin, vitamin B-12, 5,000 mcg capsule Take 1 tablet by mouth once daily.      DOCOSAHEXAENOIC ACID ORAL Take 2 g by mouth once daily.      famotidine (Pepcid) 20 mg tablet Take 1 tablet (20 mg) by mouth once daily.      fish oil concentrate (Omega-3) 120-180 mg capsule Take 1 capsule (1 g) by mouth once daily.      hydroCHLOROthiazide (Microzide) 12.5 mg capsule Take 1 capsule (12.5 mg) by mouth every other day.      lisinopril 5 mg tablet Take 1 tablet (5 mg) by mouth once daily.      magnesium oxide 500 mg tablet Take 1 tablet (500 mg) by mouth once daily.      multivitamin tablet Take by mouth see administration instructions.      naproxen (Naprosyn) 375 mg tablet       nitroglycerin (Nitrostat) 0.4 mg SL tablet Place under the tongue see administration instructions. Take 1 tablet under the tongue every 5 minutes as needed for chest pain      NON FORMULARY CBD oil BID      potassium  bicarb-citric acid 10 mEq tablet, effervescent Take by mouth twice a day.      potassium chloride CR (Klor-Con) 10 mEq ER tablet Take 1 tablet (10 mEq) by mouth every other day.      rosuvastatin (Crestor) 40 mg tablet Take 1 tablet (40 mg) by mouth once daily.      tamsulosin (Flomax) 0.4 mg 24 hr capsule Take 1 capsule (0.4 mg) by mouth once daily. 30 capsule 2     No current facility-administered medications for this visit.       Past Surgical History:   Procedure Laterality Date    CARDIAC CATHETERIZATION  07/26/2018    Cardiac Cath Procedure Summary    HAND SURGERY  11/17/2015    Hand Surgery                                                                                                                                                             Family History   Problem Relation Name Age of Onset    Cancer Mother          malignant neoplasm        Allergies   Allergen Reactions    Penicillins Unknown        Past Medical History:   Diagnosis Date    Abnormal findings on diagnostic imaging of heart and coronary circulation     Abnormal echocardiogram    Atherosclerotic heart disease of native coronary artery without angina pectoris 11/18/2022    Coronary artery disease    Body mass index (BMI) 29.0-29.9, adult 05/21/2021    Body mass index (BMI) of 29.0 to 29.9 in adult    Essential (primary) hypertension 07/20/2022    Hypertension, unspecified type    Gout, unspecified     Gout, arthritis    Personal history of nicotine dependence     History of tobacco abuse    Personal history of other diseases of the circulatory system     History of cardiac disorder    Personal history of other specified conditions     History of chest pain    Personal history of transient ischemic attack (TIA), and cerebral infarction without residual deficits 07/30/2019    History of cerebrovascular accident        Review of Systems:   No fevers, chills, chest pain, or shortness of breath.     Physical Exam:  No physical exam was performed  "during this encounter    Lab Results   Component Value Date    WBC 9.5 05/21/2021    HGB 15.6 05/21/2021    HCT 48.1 05/21/2021     05/21/2021     05/21/2021     Lab Results   Component Value Date    GLUCOSE 117 (H) 08/02/2023    CALCIUM 9.7 08/02/2023     08/02/2023    K 4.1 08/02/2023    CO2 30 08/02/2023     08/02/2023    BUN 21 08/02/2023    CREATININE 0.86 08/02/2023     Lab Results   Component Value Date    PSA 2.34 10/05/2023    PSA 2.87 04/26/2022    PSA 1.77 04/27/2021     No results found for: \"HGBA1C\"  No results found.          Diagnoses and all orders for this visit:  Dysuria (Primary)      Is an 78-year-old male with multiple urologic complaints.  Urinary frequency is his primary complaint.  He has a history of bladder stones and kidney stones in the past.  He most recently underwent a CT scan which does not demonstrate any stones in his bladder and there is a small 3 mm stone in his kidney.  He has tried the Flomax for 2 months and he is finding that it is very helpful.    1.  Lower urinary tract symptoms  Patient is very happy with the response that he has achieved on the Flomax.  He does not wish to try trospium at this point.  We will plan to perform a PVR on him in 3 months.    2.  Erectile dysfunction  I had a extensive discussion with the patient about management of his erectile dysfunction.  His wife is present with him and urges that no aggressive interventions be taken as it is not a high priority for them.  We will follow-up on this.    The dictation was performed using Dragon software. Please excuse any errors. Please contact our office with any inquiries or clarifications.      Isabel Cardona MD    "

## 2024-02-12 DIAGNOSIS — I10 HYPERTENSION, UNSPECIFIED TYPE: Primary | ICD-10-CM

## 2024-02-12 RX ORDER — LISINOPRIL 5 MG/1
5 TABLET ORAL DAILY
Qty: 90 TABLET | Refills: 3 | Status: SHIPPED | OUTPATIENT
Start: 2024-02-12

## 2024-02-19 ENCOUNTER — OFFICE VISIT (OUTPATIENT)
Dept: ORTHOPEDIC SURGERY | Facility: CLINIC | Age: 79
End: 2024-02-19
Payer: MEDICARE

## 2024-02-19 VITALS — HEIGHT: 72 IN | WEIGHT: 219 LBS | BODY MASS INDEX: 29.66 KG/M2

## 2024-02-19 DIAGNOSIS — M25.512 CHRONIC LEFT SHOULDER PAIN: Primary | ICD-10-CM

## 2024-02-19 DIAGNOSIS — M19.012 ARTHRITIS OF LEFT SHOULDER REGION: ICD-10-CM

## 2024-02-19 DIAGNOSIS — G89.29 CHRONIC LEFT SHOULDER PAIN: Primary | ICD-10-CM

## 2024-02-19 PROCEDURE — 99213 OFFICE O/P EST LOW 20 MIN: CPT | Performed by: ORTHOPAEDIC SURGERY

## 2024-02-19 PROCEDURE — 20610 DRAIN/INJ JOINT/BURSA W/O US: CPT | Performed by: ORTHOPAEDIC SURGERY

## 2024-02-19 PROCEDURE — 1126F AMNT PAIN NOTED NONE PRSNT: CPT | Performed by: ORTHOPAEDIC SURGERY

## 2024-02-19 PROCEDURE — 1036F TOBACCO NON-USER: CPT | Performed by: ORTHOPAEDIC SURGERY

## 2024-02-19 RX ORDER — LIDOCAINE HYDROCHLORIDE 20 MG/ML
2 INJECTION, SOLUTION INFILTRATION; PERINEURAL
Status: COMPLETED | OUTPATIENT
Start: 2024-02-19 | End: 2024-02-19

## 2024-02-19 RX ORDER — TRIAMCINOLONE ACETONIDE 40 MG/ML
80 INJECTION, SUSPENSION INTRA-ARTICULAR; INTRAMUSCULAR
Status: COMPLETED | OUTPATIENT
Start: 2024-02-19 | End: 2024-02-19

## 2024-02-19 RX ADMIN — TRIAMCINOLONE ACETONIDE 80 MG: 40 INJECTION, SUSPENSION INTRA-ARTICULAR; INTRAMUSCULAR at 14:58

## 2024-02-19 RX ADMIN — LIDOCAINE HYDROCHLORIDE 2 ML: 20 INJECTION, SOLUTION INFILTRATION; PERINEURAL at 14:58

## 2024-02-19 NOTE — PROGRESS NOTES
Subjective    Patient ID: Raghu Williamson is a 78 y.o. male.    Chief Complaint: Follow-up of the Left Shoulder     Last Surgery: No surgery found  Last Surgery Date: No surgery found    Left Shoulder       Patient is an 78-year-old right-hand-dominant male who is known left shoulder arthritis.  He does not wish to undergo surgery.  He typically gets adequate relief with a Kenalog injection.  His last injection was over 4 months ago.  He denies any new trauma to his left upper extremity.  He denies fevers or chills.  Objective   Ortho Exam  Patient is in no acute distress.  Exam of his left shoulder reveals there is no warmth erythema.  He does have crepitus with both forward elevation and rotation of the shoulder.  Active forward elevation is about 140 degrees.  He has 4-5 strength with resisted forward elevation as well as with resisted internal/external rotation.      Assessment/Plan   Encounter Diagnoses:  Chronic left shoulder pain    Arthritis of left shoulder region    Patient has known left shoulder arthritis.  He wished undergo another Kenalog injection today in the office.    L Inj/Asp: L glenohumeral on 2/19/2024 2:58 PM  Indications: pain  Details: 22 G needle, posterior approach  Medications: 80 mg triamcinolone acetonide 40 mg/mL; 2 mL lidocaine 20 mg/mL (2 %)  Outcome: tolerated well, no immediate complications  Procedure, treatment alternatives, risks and benefits explained, specific risks discussed. Consent was given by the patient. Immediately prior to procedure a time out was called to verify the correct patient, procedure, equipment, support staff and site/side marked as required. Patient was prepped and draped in the usual sterile fashion.       Patient was again informed it will take at least a week for the injection have an effect.  He will follow-up as his symptoms dictate.

## 2024-04-16 ENCOUNTER — OFFICE VISIT (OUTPATIENT)
Dept: UROLOGY | Facility: CLINIC | Age: 79
End: 2024-04-16
Payer: MEDICARE

## 2024-04-16 VITALS
BODY MASS INDEX: 28.88 KG/M2 | WEIGHT: 210 LBS | DIASTOLIC BLOOD PRESSURE: 91 MMHG | SYSTOLIC BLOOD PRESSURE: 172 MMHG | HEART RATE: 82 BPM | TEMPERATURE: 96.8 F

## 2024-04-16 DIAGNOSIS — R35.0 URINARY FREQUENCY: Primary | ICD-10-CM

## 2024-04-16 PROCEDURE — 3077F SYST BP >= 140 MM HG: CPT | Performed by: STUDENT IN AN ORGANIZED HEALTH CARE EDUCATION/TRAINING PROGRAM

## 2024-04-16 PROCEDURE — 3080F DIAST BP >= 90 MM HG: CPT | Performed by: STUDENT IN AN ORGANIZED HEALTH CARE EDUCATION/TRAINING PROGRAM

## 2024-04-16 PROCEDURE — 1159F MED LIST DOCD IN RCRD: CPT | Performed by: STUDENT IN AN ORGANIZED HEALTH CARE EDUCATION/TRAINING PROGRAM

## 2024-04-16 PROCEDURE — 99214 OFFICE O/P EST MOD 30 MIN: CPT | Performed by: STUDENT IN AN ORGANIZED HEALTH CARE EDUCATION/TRAINING PROGRAM

## 2024-04-16 RX ORDER — TAMSULOSIN HYDROCHLORIDE 0.4 MG/1
0.4 CAPSULE ORAL DAILY
Qty: 90 CAPSULE | Refills: 3 | Status: SHIPPED | OUTPATIENT
Start: 2024-04-16 | End: 2025-04-11

## 2024-04-16 NOTE — PROGRESS NOTES
Raghu Williamson 1945 is a AGE@ male seen today for follow-up.    Referred by: No referring provider defined for this encounter.    CC: Urinary frequency     HPI:    4/16/2024:  Patient is here for follow-up  He is doing well with the Flomax and would like to continue the current dose  Wife is here with him and agreeing that they do not want to pursue any intervention at this point for erectile dysfunction however patient states that he has been noticing some increased frequency of spontaneous erections    1/16/2024:  I had a telephone visit with patient  Patient states that he has been doing very well on the prescription of Flomax and he is very satisfied with his voiding parameters at this point  He states that he is still getting up approximately 8 times at night he is down to 2-3 times  If his voiding parameters were to remain at this level he would be satisfied  He feels like he is emptying his bladder completely    11/21/2023:  Patient is an 78-year-old male with a past medical history significant for lower urinary tract symptoms, bladder stone, kidney stone, urinary urgency, erectile dysfunction who presents for his routine follow-up  Patient was last seen by Dr. Costello in August 2023 at which point he had significant complaints of urinary urgency  There was concern that he may have a active bladder stone versus kidney stone  Plan was for a CT scan which was personally reviewed by me today  On encounter today patient states that he has persistent urinary urgency  He tried Flomax for few days but then stopped because he felt like it was not benefiting him    He is also interested in potential interventions for erectile dysfunction  He has tried oral medications with no benefit  I had a discussion with him about ICI today and he would like to think about this    Prostate Specific AG   Date Value Ref Range Status   10/05/2023 2.34 <=4.00 ng/mL Final     Comment:     result     PSA   Date Value Ref Range Status    04/26/2022 2.87 0.00 - 4.00 ng/mL Final     Comment:     The FDA requires that the method used for PSA assay be   reported to the physician. Values obtained with different   assay methods must not be used interchangeably. This test   was performed at [facility] using the Siemens  [employer] PSA method, which is a sandwich immunoassay using   chemiluminescence for quantitation. The assay is approved  for measurement of prostate-specific antigen (PSA) in   serum and may be used in conjunction with a digital rectal  examination in men 50 years and older as an aid in   detection of prostate cancer.   5-Alpha-reductase inhibitors (e.g. Proscar, Finasteride,   Avodart, Dutasteride and Flori) for the treatment of BPH   have been shown to lower PSA levels by an average of 50%   after 6 months of treatment.     04/27/2021 1.77 0.00 - 4.00 ng/mL Final     Comment:     The FDA requires that the method used for PSA assay be   reported to the physician. Values obtained with different   assay methods must not be used interchangeably. This test   was performed at [facility] using the Siemens  Atellica PSA method, which is a sandwich immunoassay using   chemiluminescence for quantitation. The assay is approved  for measurement of prostate-specific antigen (PSA) in   serum and may be used in conjunction with a digital rectal  examination in men 50 years and older as an aid in   detection of prostate cancer.   5-Alpha-reductase inhibitors (e.g. Proscar, Finasteride,   Avodart, Dutasteride and Flori) for the treatment of BPH   have been shown to lower PSA levels by an average of 50%   after 6 months of treatment.     03/19/2019 1.40 0.00 - 4.00 ng/mL Final     Comment:     The FDA requires that the method used for PSA  assay be reported to the physician. Values  obtained with different assay methods must not  be used interchangeably.  uses the ADVIA  Centaur PSA method, which is a  sandwich  immunoassay using chemiluminescence for  quantitation. The assay is approved for  measurement of prostate-specific antigen (PSA)  in serum and may be used in conjunction with  a digital rectal examination in men 50 years  and older as an aid in detection of prostate  cancer.      03/14/2018 1.25 0.00 - 4.00 ng/mL Final     Comment:     The FDA requires that the method used for PSA  assay be reported to the physician. Values  obtained with different assay methods must not  be used interchangeably.  uses the ADVIA  Centaur PSA method, which is a sandwich  immunoassay using chemiluminescence for  quantitation. The assay is approved for  measurement of prostate-specific antigen (PSA)  in serum and may be used in conjunction with  a digital rectal examination in men 50 years  and older as an aid in detection of prostate  cancer.           reports that he has never smoked. He has never used smokeless tobacco. He reports that he does not drink alcohol.    Current Outpatient Medications   Medication Sig Dispense Refill    acetaminophen 500 mg capsule Take 1 capsule (500 mg) by mouth once daily.      arginine, L-arginine, 500 mg capsule Take by mouth see administration instructions.      aspirin 81 mg EC tablet Take 1 tablet (81 mg) by mouth once daily.      cholecalciferol (Vitamin D-3) 25 MCG (1000 UT) capsule Take 1 capsule (25 mcg) by mouth once daily.      cyanocobalamin, vitamin B-12, 5,000 mcg capsule Take 1 tablet by mouth once daily.      DOCOSAHEXAENOIC ACID ORAL Take 2 g by mouth once daily.      famotidine (Pepcid) 20 mg tablet Take 1 tablet (20 mg) by mouth once daily.      fish oil concentrate (Omega-3) 120-180 mg capsule Take 1 capsule (1 g) by mouth once daily.      hydroCHLOROthiazide (Microzide) 12.5 mg capsule Take 1 capsule (12.5 mg) by mouth every other day.      lisinopril 5 mg tablet Take 1 tablet (5 mg) by mouth once daily. 90 tablet 3    magnesium oxide 500 mg tablet Take 1 tablet (500  mg) by mouth once daily.      multivitamin tablet Take by mouth see administration instructions.      naproxen (Naprosyn) 375 mg tablet       nitroglycerin (Nitrostat) 0.4 mg SL tablet Place under the tongue see administration instructions. Take 1 tablet under the tongue every 5 minutes as needed for chest pain      NON FORMULARY CBD oil BID      potassium bicarb-citric acid 10 mEq tablet, effervescent Take by mouth twice a day.      potassium chloride CR (Klor-Con) 10 mEq ER tablet Take 1 tablet (10 mEq) by mouth every other day.      rosuvastatin (Crestor) 40 mg tablet Take 1 tablet (40 mg) by mouth once daily.      tamsulosin (Flomax) 0.4 mg 24 hr capsule Take 1 capsule (0.4 mg) by mouth once daily. 90 capsule 3     No current facility-administered medications for this visit.       Past Surgical History:   Procedure Laterality Date    CARDIAC CATHETERIZATION  07/26/2018    Cardiac Cath Procedure Summary    HAND SURGERY  11/17/2015    Hand Surgery                                                                                                                                                             Family History   Problem Relation Name Age of Onset    Cancer Mother          malignant neoplasm        Allergies   Allergen Reactions    Penicillins Unknown        Past Medical History:   Diagnosis Date    Abnormal findings on diagnostic imaging of heart and coronary circulation     Abnormal echocardiogram    Atherosclerotic heart disease of native coronary artery without angina pectoris 11/18/2022    Coronary artery disease    Body mass index (BMI) 29.0-29.9, adult 05/21/2021    Body mass index (BMI) of 29.0 to 29.9 in adult    Essential (primary) hypertension 07/20/2022    Hypertension, unspecified type    Gout, unspecified     Gout, arthritis    Personal history of nicotine dependence     History of tobacco abuse    Personal history of other diseases of the circulatory system     History of cardiac disorder     "Personal history of other specified conditions     History of chest pain    Personal history of transient ischemic attack (TIA), and cerebral infarction without residual deficits 07/30/2019    History of cerebrovascular accident        Review of Systems:   No fevers, chills, chest pain, or shortness of breath.     Physical Exam:  No physical exam was performed during this encounter    Lab Results   Component Value Date    WBC 9.5 05/21/2021    HGB 15.6 05/21/2021    HCT 48.1 05/21/2021     05/21/2021     05/21/2021     Lab Results   Component Value Date    GLUCOSE 117 (H) 08/02/2023    CALCIUM 9.7 08/02/2023     08/02/2023    K 4.1 08/02/2023    CO2 30 08/02/2023     08/02/2023    BUN 21 08/02/2023    CREATININE 0.86 08/02/2023     Lab Results   Component Value Date    PSA 2.34 10/05/2023    PSA 2.87 04/26/2022    PSA 1.77 04/27/2021     No results found for: \"HGBA1C\"  No results found.          There are no diagnoses linked to this encounter.      Is an 78-year-old male with multiple urologic complaints.  Urinary frequency is his primary complaint.  He has a history of bladder stones and kidney stones in the past.  He most recently underwent a CT scan which does not demonstrate any stones in his bladder and there is a small 3 mm stone in his kidney.  He has tried the Flomax for 2 months and he is finding that it is very helpful.    1.  Lower urinary tract symptoms  Flomax was renewed for 1 year    2.  Erectile dysfunction  I had a extensive discussion with the patient about management of his erectile dysfunction.  His wife is present with him and urges that no aggressive interventions be taken as it is not a high priority for them.      Return to clinic in 1 year    The dictation was performed using Dragon software. Please excuse any errors. Please contact our office with any inquiries or clarifications.      Isabel Cardona MD    "

## 2024-06-27 ENCOUNTER — APPOINTMENT (OUTPATIENT)
Dept: ORTHOPEDIC SURGERY | Facility: CLINIC | Age: 79
End: 2024-06-27
Payer: MEDICARE

## 2024-06-27 DIAGNOSIS — G89.29 CHRONIC LEFT SHOULDER PAIN: Primary | ICD-10-CM

## 2024-06-27 DIAGNOSIS — M19.012 ARTHRITIS OF LEFT SHOULDER REGION: ICD-10-CM

## 2024-06-27 DIAGNOSIS — M25.512 CHRONIC LEFT SHOULDER PAIN: Primary | ICD-10-CM

## 2024-06-27 PROCEDURE — 99213 OFFICE O/P EST LOW 20 MIN: CPT | Performed by: ORTHOPAEDIC SURGERY

## 2024-06-27 PROCEDURE — 20610 DRAIN/INJ JOINT/BURSA W/O US: CPT | Performed by: ORTHOPAEDIC SURGERY

## 2024-06-27 RX ORDER — TRIAMCINOLONE ACETONIDE 40 MG/ML
80 INJECTION, SUSPENSION INTRA-ARTICULAR; INTRAMUSCULAR
Status: COMPLETED | OUTPATIENT
Start: 2024-06-27 | End: 2024-06-27

## 2024-06-27 RX ORDER — LIDOCAINE HYDROCHLORIDE 20 MG/ML
2 INJECTION, SOLUTION INFILTRATION; PERINEURAL
Status: COMPLETED | OUTPATIENT
Start: 2024-06-27 | End: 2024-06-27

## 2024-06-27 NOTE — PROGRESS NOTES
Subjective    Patient ID: Raghu Williamson is a 79 y.o. male.    Chief Complaint: Follow-up of the Left Shoulder     Last Surgery: No surgery found  Last Surgery Date: No surgery found    Left Shoulder       Patient comes in for follow-up of his left shoulder arthritis.  This is a chronic condition.  He does not wish to undergo surgery for it.  He typically gets adequate relief with the use of a Kenalog injection.  His last injection was over 4 months ago.    Objective   Ortho Exam  Patient is in no acute distress.  Exam of his left shoulder reveals a skin envelope is intact.  There is no warmth erythema.  There is ratcheting crepitus with internal/external rotation.  Active forward ovation is about 145 degrees.    Assessment/Plan   Encounter Diagnoses:  Chronic left shoulder pain    Arthritis of left shoulder region    Patient has known left shoulder arthritis.  He wished undergo another Kenalog injection today in the office.  L Inj/Asp: L glenohumeral on 6/27/2024 11:03 AM  Indications: pain  Details: 22 G needle, posterior approach  Medications: 80 mg triamcinolone acetonide 40 mg/mL; 2 mL lidocaine 20 mg/mL (2 %)  Procedure, treatment alternatives, risks and benefits explained, specific risks discussed. Consent was given by the patient. Immediately prior to procedure a time out was called to verify the correct patient, procedure, equipment, support staff and site/side marked as required. Patient was prepped and draped in the usual sterile fashion.       Patient was informed that we will take approximately 1 week for the injection have an effect.  He otherwise will follow-up as his symptoms dictate.

## 2024-07-17 ENCOUNTER — TELEPHONE (OUTPATIENT)
Dept: CARDIOLOGY | Facility: CLINIC | Age: 79
End: 2024-07-17
Payer: MEDICARE

## 2024-07-18 ENCOUNTER — HOSPITAL ENCOUNTER (OUTPATIENT)
Dept: RADIOLOGY | Facility: CLINIC | Age: 79
Discharge: HOME | End: 2024-07-18
Payer: MEDICARE

## 2024-07-18 ENCOUNTER — HOSPITAL ENCOUNTER (OUTPATIENT)
Dept: CARDIOLOGY | Facility: CLINIC | Age: 79
Discharge: HOME | End: 2024-07-18
Payer: MEDICARE

## 2024-07-18 DIAGNOSIS — I25.10 ATHEROSCLEROTIC HEART DISEASE OF NATIVE CORONARY ARTERY WITHOUT ANGINA PECTORIS: Primary | ICD-10-CM

## 2024-07-18 DIAGNOSIS — R79.89 LOW TESTOSTERONE IN MALE: ICD-10-CM

## 2024-07-18 DIAGNOSIS — I71.40 ABDOMINAL AORTIC ANEURYSM WITHOUT RUPTURE (CMS-HCC): Primary | ICD-10-CM

## 2024-07-18 DIAGNOSIS — I25.10 ATHEROSCLEROTIC HEART DISEASE OF NATIVE CORONARY ARTERY WITHOUT ANGINA PECTORIS: ICD-10-CM

## 2024-07-18 DIAGNOSIS — I10 HYPERTENSION: Primary | ICD-10-CM

## 2024-07-18 DIAGNOSIS — I71.40 ABDOMINAL AORTIC ANEURYSM WITHOUT RUPTURE (CMS-HCC): ICD-10-CM

## 2024-07-18 PROCEDURE — 78452 HT MUSCLE IMAGE SPECT MULT: CPT

## 2024-07-18 PROCEDURE — 2500000004 HC RX 250 GENERAL PHARMACY W/ HCPCS (ALT 636 FOR OP/ED): Performed by: INTERNAL MEDICINE

## 2024-07-18 PROCEDURE — 93017 CV STRESS TEST TRACING ONLY: CPT

## 2024-07-18 RX ORDER — REGADENOSON 0.08 MG/ML
0.4 INJECTION, SOLUTION INTRAVENOUS ONCE
Status: COMPLETED | OUTPATIENT
Start: 2024-07-18 | End: 2024-07-18

## 2024-07-29 DIAGNOSIS — R60.0 EDEMA, LOWER EXTREMITY: ICD-10-CM

## 2024-07-29 RX ORDER — HYDROCHLOROTHIAZIDE 12.5 MG/1
12.5 CAPSULE ORAL EVERY OTHER DAY
Qty: 90 CAPSULE | Refills: 3 | Status: SHIPPED | OUTPATIENT
Start: 2024-07-29

## 2024-07-29 RX ORDER — POTASSIUM CHLORIDE 750 MG/1
10 TABLET, FILM COATED, EXTENDED RELEASE ORAL EVERY OTHER DAY
Qty: 90 TABLET | Refills: 3 | Status: SHIPPED | OUTPATIENT
Start: 2024-07-29

## 2024-08-05 PROBLEM — R05.9 COUGH: Status: RESOLVED | Noted: 2023-03-13 | Resolved: 2024-08-05

## 2024-08-05 PROBLEM — M19.012 ARTHRITIS OF LEFT GLENOHUMERAL JOINT: Status: RESOLVED | Noted: 2023-03-13 | Resolved: 2024-08-05

## 2024-08-05 PROBLEM — I10 HYPERTENSION: Chronic | Status: ACTIVE | Noted: 2023-03-13

## 2024-08-05 PROBLEM — J30.9 ALLERGIC RHINITIS: Status: RESOLVED | Noted: 2023-03-13 | Resolved: 2024-08-05

## 2024-08-05 PROBLEM — I25.10 CORONARY ARTERY DISEASE: Chronic | Status: ACTIVE | Noted: 2023-03-13

## 2024-08-05 PROBLEM — R26.2 DIFFICULTY WALKING: Status: RESOLVED | Noted: 2023-03-13 | Resolved: 2024-08-05

## 2024-08-05 PROBLEM — R30.0 DYSURIA: Status: RESOLVED | Noted: 2023-03-13 | Resolved: 2024-08-05

## 2024-08-05 PROBLEM — R35.0 URINARY FREQUENCY: Status: RESOLVED | Noted: 2023-03-13 | Resolved: 2024-08-05

## 2024-08-05 PROBLEM — E66.811 CLASS 1 OBESITY WITH BODY MASS INDEX (BMI) OF 30.0 TO 30.9 IN ADULT: Status: RESOLVED | Noted: 2023-03-13 | Resolved: 2024-08-05

## 2024-08-05 PROBLEM — R30.0 BURNING WITH URINATION: Status: RESOLVED | Noted: 2023-03-13 | Resolved: 2024-08-05

## 2024-08-05 PROBLEM — R14.0 ABDOMINAL BLOATING: Status: RESOLVED | Noted: 2023-03-13 | Resolved: 2024-08-05

## 2024-08-05 PROBLEM — H26.9 CATARACT: Status: RESOLVED | Noted: 2023-03-13 | Resolved: 2024-08-05

## 2024-08-05 PROBLEM — M54.50 LOW BACK PAIN: Status: RESOLVED | Noted: 2023-03-13 | Resolved: 2024-08-05

## 2024-08-05 PROBLEM — E66.9 CLASS 1 OBESITY WITH BODY MASS INDEX (BMI) OF 30.0 TO 30.9 IN ADULT: Status: RESOLVED | Noted: 2023-03-13 | Resolved: 2024-08-05

## 2024-08-05 PROBLEM — K58.0 IRRITABLE BOWEL SYNDROME WITH DIARRHEA: Status: RESOLVED | Noted: 2023-03-13 | Resolved: 2024-08-05

## 2024-08-05 PROBLEM — M25.512 PAIN IN JOINT OF LEFT SHOULDER: Status: RESOLVED | Noted: 2023-03-13 | Resolved: 2024-08-05

## 2024-08-05 PROBLEM — I63.9 CEREBROVASCULAR ACCIDENT (CVA) (MULTI): Chronic | Status: ACTIVE | Noted: 2023-03-13

## 2024-08-05 PROBLEM — E78.00 HYPERCHOLESTEREMIA: Chronic | Status: ACTIVE | Noted: 2023-03-13

## 2024-08-05 NOTE — PROGRESS NOTES
Referred by No ref. provider found    HPI Miguel Angel is here for his yearly check. Feeling well. No CP/SOB. No palp. 3 slip and falls    Past Medical History:  Problem List Items Addressed This Visit    None     Past Medical History:   Diagnosis Date    Abnormal findings on diagnostic imaging of heart and coronary circulation     Abnormal echocardiogram    Atherosclerotic heart disease of native coronary artery without angina pectoris 11/18/2022    Coronary artery disease    Body mass index (BMI) 29.0-29.9, adult 05/21/2021    Body mass index (BMI) of 29.0 to 29.9 in adult    Essential (primary) hypertension 07/20/2022    Hypertension, unspecified type    Gout, unspecified     Gout, arthritis    Personal history of nicotine dependence     History of tobacco abuse    Personal history of other diseases of the circulatory system     History of cardiac disorder    Personal history of other specified conditions     History of chest pain    Personal history of transient ischemic attack (TIA), and cerebral infarction without residual deficits 07/30/2019    History of cerebrovascular accident         Past Surgical History:  He has a past surgical history that includes Hand surgery (11/17/2015) and Cardiac catheterization (07/26/2018).      Social History:  He reports that he has never smoked. He has never used smokeless tobacco. He reports that he does not drink alcohol. No history on file for drug use.    Family History:  Family History   Problem Relation Name Age of Onset    Cancer Mother          malignant neoplasm     Allergies:  Penicillins    Outpatient Medications:  Current Outpatient Medications   Medication Instructions    acetaminophen 500 mg capsule 1 capsule, oral, Daily    arginine, L-arginine, 500 mg capsule oral, See admin instructions    aspirin 81 mg EC tablet 1 tablet, oral, Daily    cholecalciferol (Vitamin D-3) 25 MCG (1000 UT) capsule 1 capsule, oral, Daily    cyanocobalamin, vitamin B-12, 5,000 mcg capsule 1  tablet, oral, Daily    DOCOSAHEXAENOIC ACID ORAL 2 g, oral, Daily RT    famotidine (Pepcid) 20 mg tablet 1 tablet, oral, Daily    fish oil concentrate (Omega-3) 120-180 mg capsule 1 capsule, oral, Daily    hydroCHLOROthiazide (MICROZIDE) 12.5 mg, oral, Every other day    lisinopril 5 mg, oral, Daily    magnesium oxide 500 mg tablet 1 tablet, oral, Daily    multivitamin tablet oral, See admin instructions    naproxen (Naprosyn) 375 mg tablet     nitroglycerin (Nitrostat) 0.4 mg SL tablet sublingual, See admin instructions, Take 1 tablet under the tongue every 5 minutes as needed for chest pain    NON FORMULARY CBD oil BID    potassium bicarb-citric acid 10 mEq tablet, effervescent oral, 2 times daily    potassium chloride CR (Klor-Con) 10 mEq ER tablet 10 mEq, oral, Every other day    rosuvastatin (Crestor) 40 mg tablet 1 tablet, oral, Daily    tamsulosin (FLOMAX) 0.4 mg, oral, Daily     Last Recorded Vitals:  There were no vitals filed for this visit.    Physical Exam  Patient is alert and oriented x3.  HEENT is unremarkable mucous members are moist  Neck no JVP no bruits upstrokes are full no thyromegaly  Lungs are clear bilaterally.  No wheezing crackles or rales  Heart regular rhythm normal S1-S2 there is no S3 no murmurs are heard.  Abdomen is soft bs are positive nontender nondistended no organomegaly no pulsatile masses  Extremities have no edema.  Distal pulses present palpable.  Neuro is grossly nonfocal  Skin has no rashes     Last Labs:  CBC -  Lab Results   Component Value Date    WBC 9.5 05/21/2021    HGB 15.6 05/21/2021    HCT 48.1 05/21/2021     05/21/2021     05/21/2021     CMP -  Lab Results   Component Value Date    CALCIUM 9.7 08/02/2023    PROT 6.6 08/02/2023    ALBUMIN 4.2 08/02/2023    AST 18 08/02/2023    ALT 17 08/02/2023    ALKPHOS 55 08/02/2023    BILITOT 0.8 08/02/2023     LIPID PANEL -   Lab Results   Component Value Date    CHOL 91 08/02/2023    HDL 38.3 (A) 08/02/2023     CHHDL 2.4 08/02/2023    VLDL 20 08/02/2023    TRIG 102 08/02/2023     RENAL FUNCTION PANEL -   Lab Results   Component Value Date    K 4.1 08/02/2023     Lab Results   Component Value Date    BNP 26 12/30/2019   Procedure    7/18/2024 AST normal EF 50%    ECHO [06/22/2023]: Est EF 40%. SD = impaired relaxation pattern.      CAROTID [06/06/2022]: Less than 50% stenosis proximal HORACIO / LICA      PHARM NST [06/23/2020]: Probable normal w/diaph attenuation artifact. Low normal contractility of all myocardial segments w/calc EF 53%.     CAROTID [06/23/2020]: Less than 50% HORACIO / LICA      ECHO [01/08/2020]: Est EF 40%. SD = impaired relaxation pattern. Global hypokinesis of LV w/minor regional variations.     CAC [08/31/2017] = 637 (, , LCx 0, RCA 78)      CAROTID [07/2018]: Mild BL     CATHÂ [09/22/2017]: 85% Prox LAD lesion w/ALLA 50% Prox RCA lesion w/NEGATIVE FFR of 0.91. Severe single disease involving 85% Mid-LAD discrete stenosis seen in prox.-LAD Md discrete stenosis. Mod-Severe disease involving Prox-RCA.     ECHOÂ [08/04/2017]: Low normal LVF, 50% EF. Impaired relax. LVF. Severe hypok. basilar - mid-inferolateral wall. Pulm art. not well visual.     PHARM NSTÂ [09/05/2017]: ABN: ev/for probable ischemia involving the apex. In addition, there is ev/for a possible prior basilar inferior wall infarction w/o residual heraclio-infarct ischemia. WMA w/calc EF 54%.     CACÂ [08/31/2017] = 637 . . . Mid-lower ascending TA 3.5cm. Trace Pericard. fluid superiorly.     CAROTID [08/04/2017]: Mild plaque formation w/in bilat carotid bifuraction regions     AA U/SÂ [08/04/2017]: No AA. Mild ectasia of common iliac art. bilat. Stable appearance compared to 7/3/2013.        Assessment/Plan     1. CAD. Stenting 2017 to the LAD. 50% concomitant RCA lesion not seem to be significant by fractional flow reserve. . Continue with aspirin, lisinopril, and rosuvastatin. Reg nuc 7/2024 was normal with ejection fraction of 50%      2. Cardiomyopathy. By echocardiography his ejection fraction was 40%. This is the same as what it was in 2020. On the regadenoson nuclear stress test it was 50%. Continue lisinopril. Continue low-dose hydrochlorothiazide.     3. 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 Mild disease bilaterally. Continue aspirin continue blood pressure control continue statins.     4. Hyperlipidemia. LDL is excellent 32 HDL 38. Repeat 1 year. This needs to be repeated soon. He'll call in 1 wk to review    EKG today. RTC 1 year  Berny Henderson MD     Instructions and follow up

## 2024-08-06 ENCOUNTER — APPOINTMENT (OUTPATIENT)
Dept: CARDIOLOGY | Facility: CLINIC | Age: 79
End: 2024-08-06
Payer: MEDICARE

## 2024-08-06 VITALS
BODY MASS INDEX: 28.06 KG/M2 | SYSTOLIC BLOOD PRESSURE: 124 MMHG | OXYGEN SATURATION: 96 % | WEIGHT: 204 LBS | HEART RATE: 91 BPM | DIASTOLIC BLOOD PRESSURE: 76 MMHG

## 2024-08-06 DIAGNOSIS — E78.00 HYPERCHOLESTEREMIA: Chronic | ICD-10-CM

## 2024-08-06 DIAGNOSIS — R60.0 EDEMA, LOWER EXTREMITY: ICD-10-CM

## 2024-08-06 DIAGNOSIS — I25.10 CORONARY ARTERY DISEASE INVOLVING NATIVE CORONARY ARTERY OF NATIVE HEART WITHOUT ANGINA PECTORIS: Chronic | ICD-10-CM

## 2024-08-06 DIAGNOSIS — I10 PRIMARY HYPERTENSION: Chronic | ICD-10-CM

## 2024-08-06 DIAGNOSIS — I50.20 HFREF (HEART FAILURE WITH REDUCED EJECTION FRACTION) (MULTI): Primary | ICD-10-CM

## 2024-08-06 PROCEDURE — 1160F RVW MEDS BY RX/DR IN RCRD: CPT | Performed by: INTERNAL MEDICINE

## 2024-08-06 PROCEDURE — 93010 ELECTROCARDIOGRAM REPORT: CPT | Performed by: INTERNAL MEDICINE

## 2024-08-06 PROCEDURE — 3074F SYST BP LT 130 MM HG: CPT | Performed by: INTERNAL MEDICINE

## 2024-08-06 PROCEDURE — 1036F TOBACCO NON-USER: CPT | Performed by: INTERNAL MEDICINE

## 2024-08-06 PROCEDURE — 1159F MED LIST DOCD IN RCRD: CPT | Performed by: INTERNAL MEDICINE

## 2024-08-06 PROCEDURE — 3078F DIAST BP <80 MM HG: CPT | Performed by: INTERNAL MEDICINE

## 2024-08-06 PROCEDURE — 99213 OFFICE O/P EST LOW 20 MIN: CPT | Performed by: INTERNAL MEDICINE

## 2024-08-06 RX ORDER — ROSUVASTATIN CALCIUM 40 MG/1
40 TABLET, COATED ORAL DAILY
Qty: 90 TABLET | Refills: 3 | Status: SHIPPED | OUTPATIENT
Start: 2024-08-06 | End: 2025-08-06

## 2024-08-06 RX ORDER — HYDROCHLOROTHIAZIDE 12.5 MG/1
12.5 CAPSULE ORAL EVERY OTHER DAY
Qty: 90 CAPSULE | Refills: 3 | Status: SHIPPED | OUTPATIENT
Start: 2024-08-06

## 2024-08-06 RX ORDER — POTASSIUM CHLORIDE 750 MG/1
10 TABLET, FILM COATED, EXTENDED RELEASE ORAL EVERY OTHER DAY
Qty: 90 TABLET | Refills: 3 | Status: SHIPPED | OUTPATIENT
Start: 2024-08-06

## 2024-08-06 NOTE — PATIENT INSTRUCTIONS
1. CAD. Stenting 2017 to the LAD. 50% concomitant RCA lesion not seem to be significant by fractional flow reserve. . Continue with aspirin, lisinopril, and rosuvastatin. Reg nuc 7/2024 was normal with ejection fraction of 50%     2. Cardiomyopathy. By echocardiography his ejection fraction was 40%. This is the same as what it was in 2020. On the regadenoson nuclear stress test it was 50%. Continue lisinopril. Continue low-dose hydrochlorothiazide.     3. 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 Mild disease bilaterally. Continue aspirin continue blood pressure control continue statins.     4. Hyperlipidemia. LDL is excellent 32 HDL 38. Repeat 1 year. This needs to be repeated soon. He'll call in 1 wk to review    EKG today. RTC 1 year

## 2024-10-04 ENCOUNTER — TELEPHONE (OUTPATIENT)
Dept: PRIMARY CARE | Facility: CLINIC | Age: 79
End: 2024-10-04
Payer: MEDICARE

## 2024-10-09 ENCOUNTER — APPOINTMENT (OUTPATIENT)
Dept: PRIMARY CARE | Facility: CLINIC | Age: 79
End: 2024-10-09
Payer: MEDICARE

## 2024-10-09 ENCOUNTER — TELEPHONE (OUTPATIENT)
Dept: PRIMARY CARE | Facility: CLINIC | Age: 79
End: 2024-10-09

## 2024-10-09 PROCEDURE — 90662 IIV NO PRSV INCREASED AG IM: CPT | Performed by: FAMILY MEDICINE

## 2024-10-09 PROCEDURE — G0008 ADMIN INFLUENZA VIRUS VAC: HCPCS | Performed by: FAMILY MEDICINE

## 2024-11-01 ENCOUNTER — APPOINTMENT (OUTPATIENT)
Dept: CARDIOLOGY | Facility: CLINIC | Age: 79
End: 2024-11-01
Payer: MEDICARE

## 2024-11-14 ENCOUNTER — APPOINTMENT (OUTPATIENT)
Dept: ORTHOPEDIC SURGERY | Facility: CLINIC | Age: 79
End: 2024-11-14
Payer: MEDICARE

## 2024-11-14 DIAGNOSIS — M25.512 CHRONIC LEFT SHOULDER PAIN: Primary | ICD-10-CM

## 2024-11-14 DIAGNOSIS — M19.012 ARTHRITIS OF LEFT SHOULDER REGION: ICD-10-CM

## 2024-11-14 DIAGNOSIS — G89.29 CHRONIC LEFT SHOULDER PAIN: Primary | ICD-10-CM

## 2024-11-14 PROCEDURE — 1160F RVW MEDS BY RX/DR IN RCRD: CPT | Performed by: ORTHOPAEDIC SURGERY

## 2024-11-14 PROCEDURE — 99213 OFFICE O/P EST LOW 20 MIN: CPT | Performed by: ORTHOPAEDIC SURGERY

## 2024-11-14 PROCEDURE — 1159F MED LIST DOCD IN RCRD: CPT | Performed by: ORTHOPAEDIC SURGERY

## 2024-11-14 PROCEDURE — 1036F TOBACCO NON-USER: CPT | Performed by: ORTHOPAEDIC SURGERY

## 2024-11-14 PROCEDURE — 20610 DRAIN/INJ JOINT/BURSA W/O US: CPT | Performed by: ORTHOPAEDIC SURGERY

## 2024-11-14 RX ORDER — TRIAMCINOLONE ACETONIDE 40 MG/ML
80 INJECTION, SUSPENSION INTRA-ARTICULAR; INTRAMUSCULAR
Status: COMPLETED | OUTPATIENT
Start: 2024-11-14 | End: 2024-11-14

## 2024-11-14 RX ORDER — LIDOCAINE HYDROCHLORIDE 20 MG/ML
2 INJECTION, SOLUTION INFILTRATION; PERINEURAL
Status: COMPLETED | OUTPATIENT
Start: 2024-11-14 | End: 2024-11-14

## 2024-11-14 NOTE — PROGRESS NOTES
Subjective    Patient ID: Raghu Williamson is a 79 y.o. male.    Chief Complaint: No chief complaint on file.     Last Surgery: No surgery found  Last Surgery Date: No surgery found    HPI  The patient returns today for follow-up of his left shoulder arthritis.  He states that for the past week he has had worsening of his symptoms compared to average.  He does not recall any specific new trauma.  He denies numbness and paresthesias.  He is beginning to give consideration to a total shoulder replacement.    Objective   Ortho Exam  The patient is in no acute distress.  Exam of his left shoulder reveals there is no warmth erythema.  There is no swelling.  Active forward elevation is about 85 to 90 degrees actively.  Passively can be brought to about 120 degrees.  He is otherwise neurovascular intact distally.    Assessment/Plan   Encounter Diagnoses:  Chronic left shoulder pain    Arthritis of left shoulder region    Patient has known left shoulder arthritis.  While he is considering a total shoulder replacement, today he still wished undergo a Kenalog injection.    L Inj/Asp: L glenohumeral on 11/14/2024 11:44 AM  Indications: pain  Details: 22 G needle, posterior approach  Medications: 80 mg triamcinolone acetonide 40 mg/mL; 2 mL lidocaine 20 mg/mL (2 %)  Procedure, treatment alternatives, risks and benefits explained, specific risks discussed. Consent was given by the patient. Immediately prior to procedure a time out was called to verify the correct patient, procedure, equipment, support staff and site/side marked as required. Patient was prepped and draped in the usual sterile fashion.         The patient tolerated the procedure without any complications.  He was informed it will take about a week for the injection have an effect.  He otherwise will follow-up as his symptoms dictate.

## 2024-12-16 ENCOUNTER — APPOINTMENT (OUTPATIENT)
Dept: PRIMARY CARE | Facility: CLINIC | Age: 79
End: 2024-12-16
Payer: MEDICARE

## 2024-12-16 VITALS
DIASTOLIC BLOOD PRESSURE: 78 MMHG | HEIGHT: 69 IN | SYSTOLIC BLOOD PRESSURE: 130 MMHG | WEIGHT: 197.8 LBS | BODY MASS INDEX: 29.3 KG/M2

## 2024-12-16 DIAGNOSIS — Z00.00 MEDICARE ANNUAL WELLNESS VISIT, SUBSEQUENT: Primary | ICD-10-CM

## 2024-12-16 DIAGNOSIS — I50.20 HFREF (HEART FAILURE WITH REDUCED EJECTION FRACTION): ICD-10-CM

## 2024-12-16 DIAGNOSIS — I10 PRIMARY HYPERTENSION: Chronic | ICD-10-CM

## 2024-12-16 DIAGNOSIS — I71.40 ABDOMINAL AORTIC ANEURYSM (AAA) WITHOUT RUPTURE, UNSPECIFIED PART (CMS-HCC): ICD-10-CM

## 2024-12-16 DIAGNOSIS — I63.9 CEREBROVASCULAR ACCIDENT (CVA), UNSPECIFIED MECHANISM (MULTI): Chronic | ICD-10-CM

## 2024-12-16 DIAGNOSIS — E78.00 HYPERCHOLESTEREMIA: Chronic | ICD-10-CM

## 2024-12-16 DIAGNOSIS — N40.0 BENIGN PROSTATIC HYPERPLASIA WITHOUT LOWER URINARY TRACT SYMPTOMS: ICD-10-CM

## 2024-12-16 PROBLEM — Z96.1 PSEUDOPHAKIA OF BOTH EYES: Status: ACTIVE | Noted: 2021-08-03

## 2024-12-16 PROBLEM — Z86.73 HISTORY OF STROKE: Status: ACTIVE | Noted: 2021-08-02

## 2024-12-16 PROBLEM — H53.002 AMBLYOPIA, LEFT: Status: ACTIVE | Noted: 2018-12-10

## 2024-12-16 PROBLEM — N39.0 ACUTE URINARY TRACT INFECTION: Status: ACTIVE | Noted: 2024-12-16

## 2024-12-16 PROBLEM — K21.9 GERD (GASTROESOPHAGEAL REFLUX DISEASE): Status: ACTIVE | Noted: 2021-07-05

## 2024-12-16 PROBLEM — R93.1 ABNORMAL ECHOCARDIOGRAPHY: Status: ACTIVE | Noted: 2024-12-16

## 2024-12-16 PROBLEM — M10.9 ARTICULAR GOUT: Status: ACTIVE | Noted: 2024-12-16

## 2024-12-16 PROBLEM — E66.3 OVERWEIGHT WITH BODY MASS INDEX (BMI) 25.0-29.9: Status: ACTIVE | Noted: 2024-12-16

## 2024-12-16 PROCEDURE — 1036F TOBACCO NON-USER: CPT | Performed by: FAMILY MEDICINE

## 2024-12-16 PROCEDURE — 1125F AMNT PAIN NOTED PAIN PRSNT: CPT | Performed by: FAMILY MEDICINE

## 2024-12-16 PROCEDURE — 1124F ACP DISCUSS-NO DSCNMKR DOCD: CPT | Performed by: FAMILY MEDICINE

## 2024-12-16 PROCEDURE — 1159F MED LIST DOCD IN RCRD: CPT | Performed by: FAMILY MEDICINE

## 2024-12-16 PROCEDURE — 1170F FXNL STATUS ASSESSED: CPT | Performed by: FAMILY MEDICINE

## 2024-12-16 PROCEDURE — 3075F SYST BP GE 130 - 139MM HG: CPT | Performed by: FAMILY MEDICINE

## 2024-12-16 PROCEDURE — 99497 ADVNCD CARE PLAN 30 MIN: CPT | Performed by: FAMILY MEDICINE

## 2024-12-16 PROCEDURE — 3078F DIAST BP <80 MM HG: CPT | Performed by: FAMILY MEDICINE

## 2024-12-16 PROCEDURE — 1160F RVW MEDS BY RX/DR IN RCRD: CPT | Performed by: FAMILY MEDICINE

## 2024-12-16 PROCEDURE — G0439 PPPS, SUBSEQ VISIT: HCPCS | Performed by: FAMILY MEDICINE

## 2024-12-16 PROCEDURE — 99397 PER PM REEVAL EST PAT 65+ YR: CPT | Performed by: FAMILY MEDICINE

## 2024-12-16 ASSESSMENT — ACTIVITIES OF DAILY LIVING (ADL)
NEEDS ASSISTANCE WITH FOOD: INDEPENDENT
USING TRANSPORTATION: INDEPENDENT
GROOMING: INDEPENDENT
ADEQUATE_TO_COMPLETE_ADL: YES
MANAGING FINANCES: INDEPENDENT
GROCERY SHOPPING: INDEPENDENT
FEEDING YOURSELF: INDEPENDENT
DOING HOUSEWORK: INDEPENDENT
TOILETING: INDEPENDENT
STIL DRIVING: YES
JUDGMENT_ADEQUATE_SAFELY_COMPLETE_DAILY_ACTIVITIES: YES
TAKING MEDICATION: INDEPENDENT
PATIENT'S MEMORY ADEQUATE TO SAFELY COMPLETE DAILY ACTIVITIES?: YES
BATHING: INDEPENDENT
USING TELEPHONE: INDEPENDENT
DRESSING YOURSELF: INDEPENDENT
WALKS IN HOME: INDEPENDENT
EATING: INDEPENDENT
PREPARING MEALS: INDEPENDENT

## 2024-12-16 ASSESSMENT — ANXIETY QUESTIONNAIRES
3. WORRYING TOO MUCH ABOUT DIFFERENT THINGS: NOT AT ALL
7. FEELING AFRAID AS IF SOMETHING AWFUL MIGHT HAPPEN: NOT AT ALL
6. BECOMING EASILY ANNOYED OR IRRITABLE: NOT AT ALL
5. BEING SO RESTLESS THAT IT IS HARD TO SIT STILL: NOT AT ALL
1. FEELING NERVOUS, ANXIOUS, OR ON EDGE: NOT AT ALL
2. NOT BEING ABLE TO STOP OR CONTROL WORRYING: NOT AT ALL
GAD7 TOTAL SCORE: 0
4. TROUBLE RELAXING: NOT AT ALL

## 2024-12-16 ASSESSMENT — GERIATRIC MINI NUTRITIONAL ASSESSMENT (MNA)
A HAS FOOD INTAKE DECLINED OVER THE PAST 3 MONTHS DUE TO LOSS OF APPETITE, DIGESTIVE PROBLEMS, CHEWING OR SWALLOWING DIFFICULTIES?: NO DECREASE IN FOOD INTAKE
D HAS SUFFERED PSYCHOLOGICAL STRESS OR ACUTE DISEASE IN THE PAST 3 MONTHS?: NO
E NEUROPSYCHOLOGICAL PROBLEMS: NO PSYCHOLOGICAL PROBLEMS
B WEIGHT LOSS DURING THE LAST 3 MONTHS: NO WEIGHT LOSS
C GENERAL MOBILITY: GOES OUT

## 2024-12-16 ASSESSMENT — ENCOUNTER SYMPTOMS
CARDIOVASCULAR NEGATIVE: 1
BACK PAIN: 1
OCCASIONAL FEELINGS OF UNSTEADINESS: 0
ENDOCRINE NEGATIVE: 1
PSYCHIATRIC NEGATIVE: 1
GASTROINTESTINAL NEGATIVE: 1
RESPIRATORY NEGATIVE: 1
NEUROLOGICAL NEGATIVE: 1
DEPRESSION: 0
CONSTITUTIONAL NEGATIVE: 1
ARTHRALGIAS: 1
LOSS OF SENSATION IN FEET: 0

## 2024-12-16 ASSESSMENT — PAIN SCALES - GENERAL: PAINLEVEL_OUTOF10: 5

## 2024-12-16 NOTE — PROGRESS NOTES
"Subjective   Patient ID: Raghu Williamson is a 79 y.o. male who presents for Annual Exam (Annual medicare wellness not fasting ).    HPI     Review of Systems   Constitutional: Negative.    HENT: Negative.     Respiratory: Negative.     Cardiovascular: Negative.         Dr Henderson   Gastrointestinal: Negative.    Endocrine: Negative.    Genitourinary: Negative.         Dr Cardona   Musculoskeletal:  Positive for arthralgias and back pain.        Dr Cortez   Skin:         Dr Mahajan   Neurological: Negative.    Psychiatric/Behavioral: Negative.         Objective   /78 (BP Location: Left arm)   Ht 1.753 m (5' 9\")   Wt 89.7 kg (197 lb 12.8 oz)   BMI 29.21 kg/m²     Physical Exam  Vitals and nursing note reviewed.   Constitutional:       Appearance: Normal appearance.   HENT:      Right Ear: Tympanic membrane normal.      Left Ear: Tympanic membrane normal.      Mouth/Throat:      Pharynx: Oropharynx is clear.   Cardiovascular:      Rate and Rhythm: Normal rate and regular rhythm.      Pulses: Normal pulses.      Heart sounds: Normal heart sounds.   Pulmonary:      Breath sounds: Normal breath sounds.   Abdominal:      Palpations: Abdomen is soft.   Musculoskeletal:         General: Normal range of motion.   Neurological:      General: No focal deficit present.      Mental Status: He is alert and oriented to person, place, and time.   Psychiatric:         Mood and Affect: Mood normal.         Behavior: Behavior normal.         Assessment/Plan patient seen here for an annual Medicare wellness exam.  Reviewed his questionnaire he is agreeable to his responses.  We did discuss advanced directives.  He has no significant difficulty with depression or anxiety.  Drawing his lab work as an outpatient.  Will see him back in a year  Problem List Items Addressed This Visit             ICD-10-CM    Abdominal aortic aneurysm without rupture (CMS-HCC) I71.40    Cerebrovascular accident (CVA) (Multi) (Chronic) I63.9    HFrEF (heart " failure with reduced ejection fraction) I50.20    Hypercholesteremia (Chronic) E78.00    Relevant Orders    CBC and Auto Differential    Hypertension (Chronic) I10     Other Visit Diagnoses         Codes    Medicare annual wellness visit, subsequent    -  Primary Z00.00    BMI 29.0-29.9,adult     Z68.29    Benign prostatic hyperplasia without lower urinary tract symptoms     N40.0    Relevant Orders    Prostate Specific Antigen

## 2024-12-16 NOTE — ACP (ADVANCE CARE PLANNING)
Confirming Previous Code Status:   Advance Care Planning Note     Discussion Date: 12/16/24   Discussion Participants: patient and spouse    The patient wishes to discuss Advance Care Planning today and the following is a brief summary of our discussion.     Patient has capacity to make their own medical decisions: Yes  Health Care Agent/Surrogate Decision Maker documented in chart: Yes    Documents on file and valid:  Advance Directive/Living Will: Yes   Health Care Power of : Yes  Other: none    Communication of Medical Status/Prognosis:   yes     Communication of Treatment Goals/Options:   yes     Treatment Decisions  Goals of Care: survival is paramount regardless of prognosis, treatment outcome, or burden   yes  Follow Up Plan  no  Team Members  myself  Time Statement: Total face to face time spent on advance care planning was 16 minutes with 16 minutes spent in counseling, including the explanation.    Carlos Manuel Vicente DO  12/16/2024 2:55 PM

## 2025-02-03 DIAGNOSIS — I10 HYPERTENSION, UNSPECIFIED TYPE: ICD-10-CM

## 2025-02-03 RX ORDER — LISINOPRIL 5 MG/1
5 TABLET ORAL DAILY
Qty: 90 TABLET | Refills: 3 | Status: SHIPPED | OUTPATIENT
Start: 2025-02-03

## 2025-02-10 DIAGNOSIS — I10 HYPERTENSION, UNSPECIFIED TYPE: ICD-10-CM

## 2025-02-10 RX ORDER — LISINOPRIL 5 MG/1
5 TABLET ORAL DAILY
Qty: 90 TABLET | Refills: 3 | Status: SHIPPED | OUTPATIENT
Start: 2025-02-10

## 2025-03-03 DIAGNOSIS — R35.0 URINARY FREQUENCY: ICD-10-CM

## 2025-03-03 RX ORDER — TAMSULOSIN HYDROCHLORIDE 0.4 MG/1
0.4 CAPSULE ORAL DAILY
Qty: 90 CAPSULE | Refills: 3 | Status: SHIPPED | OUTPATIENT
Start: 2025-03-03 | End: 2026-02-26

## 2025-04-22 LAB
ALBUMIN SERPL-MCNC: 4.1 G/DL (ref 3.6–5.1)
ALP SERPL-CCNC: 62 U/L (ref 35–144)
ALT SERPL-CCNC: 14 U/L (ref 9–46)
ANION GAP SERPL CALCULATED.4IONS-SCNC: 10 MMOL/L (CALC) (ref 7–17)
AST SERPL-CCNC: 16 U/L (ref 10–35)
BILIRUB SERPL-MCNC: 0.6 MG/DL (ref 0.2–1.2)
BUN SERPL-MCNC: 18 MG/DL (ref 7–25)
CALCIUM SERPL-MCNC: 9.3 MG/DL (ref 8.6–10.3)
CHLORIDE SERPL-SCNC: 102 MMOL/L (ref 98–110)
CHOLEST SERPL-MCNC: 100 MG/DL
CHOLEST/HDLC SERPL: 2.3 (CALC)
CO2 SERPL-SCNC: 28 MMOL/L (ref 20–32)
CREAT SERPL-MCNC: 0.96 MG/DL (ref 0.7–1.22)
EGFRCR SERPLBLD CKD-EPI 2021: 79 ML/MIN/1.73M2
GLUCOSE SERPL-MCNC: 109 MG/DL (ref 65–99)
HDLC SERPL-MCNC: 43 MG/DL
LDLC SERPL CALC-MCNC: 40 MG/DL (CALC)
NONHDLC SERPL-MCNC: 57 MG/DL (CALC)
POTASSIUM SERPL-SCNC: 3.7 MMOL/L (ref 3.5–5.3)
PROT SERPL-MCNC: 6.2 G/DL (ref 6.1–8.1)
SODIUM SERPL-SCNC: 140 MMOL/L (ref 135–146)
TRIGL SERPL-MCNC: 86 MG/DL

## 2025-04-23 LAB
BASOPHILS # BLD AUTO: 50 CELLS/UL (ref 0–200)
BASOPHILS NFR BLD AUTO: 0.7 %
EOSINOPHIL # BLD AUTO: 902 CELLS/UL (ref 15–500)
EOSINOPHIL NFR BLD AUTO: 12.7 %
ERYTHROCYTE [DISTWIDTH] IN BLOOD BY AUTOMATED COUNT: 11.3 % (ref 11–15)
HCT VFR BLD AUTO: 41.9 % (ref 38.5–50)
HGB BLD-MCNC: 14.3 G/DL (ref 13.2–17.1)
LYMPHOCYTES # BLD AUTO: 1740 CELLS/UL (ref 850–3900)
LYMPHOCYTES NFR BLD AUTO: 24.5 %
MCH RBC QN AUTO: 33.3 PG (ref 27–33)
MCHC RBC AUTO-ENTMCNC: 34.1 G/DL (ref 32–36)
MCV RBC AUTO: 97.7 FL (ref 80–100)
MONOCYTES # BLD AUTO: 646 CELLS/UL (ref 200–950)
MONOCYTES NFR BLD AUTO: 9.1 %
NEUTROPHILS # BLD AUTO: 3763 CELLS/UL (ref 1500–7800)
NEUTROPHILS NFR BLD AUTO: 53 %
PLATELET # BLD AUTO: 157 THOUSAND/UL (ref 140–400)
PMV BLD REES-ECKER: 9.9 FL (ref 7.5–12.5)
PSA SERPL-MCNC: 3.12 NG/ML
RBC # BLD AUTO: 4.29 MILLION/UL (ref 4.2–5.8)
WBC # BLD AUTO: 7.1 THOUSAND/UL (ref 3.8–10.8)

## 2025-04-29 ENCOUNTER — APPOINTMENT (OUTPATIENT)
Dept: UROLOGY | Facility: CLINIC | Age: 80
End: 2025-04-29
Payer: MEDICARE

## 2025-07-10 ENCOUNTER — APPOINTMENT (OUTPATIENT)
Dept: ORTHOPEDIC SURGERY | Facility: CLINIC | Age: 80
End: 2025-07-10
Payer: MEDICARE

## 2025-07-10 DIAGNOSIS — G89.29 CHRONIC LEFT SHOULDER PAIN: Primary | ICD-10-CM

## 2025-07-10 DIAGNOSIS — M19.012 ARTHRITIS OF LEFT SHOULDER REGION: ICD-10-CM

## 2025-07-10 DIAGNOSIS — M25.512 CHRONIC LEFT SHOULDER PAIN: Primary | ICD-10-CM

## 2025-07-10 PROCEDURE — 1159F MED LIST DOCD IN RCRD: CPT | Performed by: ORTHOPAEDIC SURGERY

## 2025-07-10 PROCEDURE — 20610 DRAIN/INJ JOINT/BURSA W/O US: CPT | Performed by: ORTHOPAEDIC SURGERY

## 2025-07-10 PROCEDURE — 1036F TOBACCO NON-USER: CPT | Performed by: ORTHOPAEDIC SURGERY

## 2025-07-10 PROCEDURE — 99213 OFFICE O/P EST LOW 20 MIN: CPT | Performed by: ORTHOPAEDIC SURGERY

## 2025-07-10 PROCEDURE — 1160F RVW MEDS BY RX/DR IN RCRD: CPT | Performed by: ORTHOPAEDIC SURGERY

## 2025-07-10 RX ORDER — LIDOCAINE HYDROCHLORIDE 20 MG/ML
2 INJECTION, SOLUTION INFILTRATION; PERINEURAL
Status: COMPLETED | OUTPATIENT
Start: 2025-07-10 | End: 2025-07-10

## 2025-07-10 RX ORDER — TRIAMCINOLONE ACETONIDE 40 MG/ML
80 INJECTION, SUSPENSION INTRA-ARTICULAR; INTRAMUSCULAR
Status: COMPLETED | OUTPATIENT
Start: 2025-07-10 | End: 2025-07-10

## 2025-07-10 RX ADMIN — LIDOCAINE HYDROCHLORIDE 2 ML: 20 INJECTION, SOLUTION INFILTRATION; PERINEURAL at 11:33

## 2025-07-10 RX ADMIN — TRIAMCINOLONE ACETONIDE 80 MG: 40 INJECTION, SUSPENSION INTRA-ARTICULAR; INTRAMUSCULAR at 11:33

## 2025-07-10 NOTE — PROGRESS NOTES
Subjective    Patient ID: Raghu Williamson is a 80 y.o. male.    Chief Complaint: Follow-up of the Left Shoulder (FUV L SHOULDER INJ LAST DONE 11/14/24)     Last Surgery: No surgery found  Last Surgery Date: No surgery found    Left Shoulder       The patient returns today for follow-up of his left shoulder arthritis.  His last injection for this was about 7 months ago.  He states he feels more pain in his shoulder now than he did prior to his last visit.  He denies any new trauma.  He denies numbness and paresthesias.    Objective   Ortho Exam  The patient is in no acute distress.  Exam of his left upper extremity reveals his skin envelope is intact.  There is no warmth erythema.  There is ratcheting crepitus with internal and external rotation.  He has active forward elevation of only about 100 degrees before pain limits him.  He is otherwise neurovascularly intact in his left upper extremity.      Assessment/Plan   Encounter Diagnoses:  Chronic left shoulder pain    Arthritis of left shoulder region    The patient has left shoulder pain secondary to known left shoulder arthritis.  Today he wished to undergo another Kenalog injection.    L Inj/Asp: L glenohumeral on 7/10/2025 11:33 AM  Indications: pain  Details: 22 G needle, posterior approach  Medications: 80 mg triamcinolone acetonide 40 mg/mL; 2 mL lidocaine 20 mg/mL (2 %)  Procedure, treatment alternatives, risks and benefits explained, specific risks discussed. Consent was given by the patient. Immediately prior to procedure a time out was called to verify the correct patient, procedure, equipment, support staff and site/side marked as required. Patient was prepped and draped in the usual sterile fashion.       The patient was informed to takes about a week for the injection of an effect.  He states that if symptoms return he will consider a total shoulder replacement but he will also discuss the medical risks with his cardiologist.

## 2025-08-05 ENCOUNTER — APPOINTMENT (OUTPATIENT)
Dept: CARDIOLOGY | Facility: CLINIC | Age: 80
End: 2025-08-05
Payer: MEDICARE

## 2025-08-08 DIAGNOSIS — I50.20 HFREF (HEART FAILURE WITH REDUCED EJECTION FRACTION): ICD-10-CM

## 2025-08-10 RX ORDER — ROSUVASTATIN CALCIUM 40 MG/1
40 TABLET, COATED ORAL DAILY
Qty: 90 TABLET | Refills: 3 | Status: SHIPPED | OUTPATIENT
Start: 2025-08-10 | End: 2026-08-10

## 2025-12-26 ENCOUNTER — APPOINTMENT (OUTPATIENT)
Dept: PRIMARY CARE | Facility: CLINIC | Age: 80
End: 2025-12-26
Payer: MEDICARE